# Patient Record
Sex: FEMALE | Race: WHITE | NOT HISPANIC OR LATINO | Employment: OTHER | URBAN - METROPOLITAN AREA
[De-identification: names, ages, dates, MRNs, and addresses within clinical notes are randomized per-mention and may not be internally consistent; named-entity substitution may affect disease eponyms.]

---

## 2017-02-15 ENCOUNTER — ANESTHESIA (OUTPATIENT)
Dept: SURGERY | Facility: HOSPITAL | Age: 37
End: 2017-02-15
Payer: MEDICAID

## 2017-02-15 ENCOUNTER — HOSPITAL ENCOUNTER (INPATIENT)
Facility: HOSPITAL | Age: 37
LOS: 2 days | Discharge: HOME | End: 2017-02-17
Attending: EMERGENCY MEDICINE | Admitting: NEUROLOGICAL SURGERY
Payer: MEDICAID

## 2017-02-15 ENCOUNTER — ANESTHESIA EVENT (OUTPATIENT)
Dept: SURGERY | Facility: HOSPITAL | Age: 37
End: 2017-02-15
Payer: MEDICAID

## 2017-02-15 DIAGNOSIS — G91.9 HYDROCEPHALUS: ICD-10-CM

## 2017-02-15 DIAGNOSIS — C79.31 SECONDARY ADENOCARCINOMA OF BRAIN: ICD-10-CM

## 2017-02-15 DIAGNOSIS — C79.31 BRAIN METASTASES: Primary | ICD-10-CM

## 2017-02-15 DIAGNOSIS — Z15.09 BRCA1 GENE MUTATION POSITIVE: ICD-10-CM

## 2017-02-15 DIAGNOSIS — Z15.01 BRCA1 GENE MUTATION POSITIVE: ICD-10-CM

## 2017-02-15 LAB
ANION GAP SERPL CALC-SCNC: 8 MMOL/L
APTT BLDCRRT: 24.2 SEC
BASOPHILS # BLD AUTO: 0.02 K/UL
BASOPHILS NFR BLD: 0.3 %
BUN SERPL-MCNC: 5 MG/DL
CALCIUM SERPL-MCNC: 9.3 MG/DL
CHLORIDE SERPL-SCNC: 110 MMOL/L
CO2 SERPL-SCNC: 22 MMOL/L
CREAT SERPL-MCNC: 0.8 MG/DL
DIFFERENTIAL METHOD: ABNORMAL
EOSINOPHIL # BLD AUTO: 0.1 K/UL
EOSINOPHIL NFR BLD: 0.9 %
ERYTHROCYTE [DISTWIDTH] IN BLOOD BY AUTOMATED COUNT: 14.6 %
EST. GFR  (AFRICAN AMERICAN): >60 ML/MIN/1.73 M^2
EST. GFR  (NON AFRICAN AMERICAN): >60 ML/MIN/1.73 M^2
GLUCOSE SERPL-MCNC: 97 MG/DL
HCT VFR BLD AUTO: 32.2 %
HGB BLD-MCNC: 10.4 G/DL
INR PPP: 1
LYMPHOCYTES # BLD AUTO: 1.4 K/UL
LYMPHOCYTES NFR BLD: 18.5 %
MCH RBC QN AUTO: 26.1 PG
MCHC RBC AUTO-ENTMCNC: 32.3 %
MCV RBC AUTO: 81 FL
MONOCYTES # BLD AUTO: 0.4 K/UL
MONOCYTES NFR BLD: 5.7 %
NEUTROPHILS # BLD AUTO: 5.7 K/UL
NEUTROPHILS NFR BLD: 74.5 %
PLATELET # BLD AUTO: 348 K/UL
PMV BLD AUTO: 9.9 FL
POTASSIUM SERPL-SCNC: 3.9 MMOL/L
PROTHROMBIN TIME: 10.6 SEC
RBC # BLD AUTO: 3.99 M/UL
SODIUM SERPL-SCNC: 140 MMOL/L
WBC # BLD AUTO: 7.68 K/UL

## 2017-02-15 PROCEDURE — 36000712 HC OR TIME LEV V 1ST 15 MIN: Performed by: NEUROLOGICAL SURGERY

## 2017-02-15 PROCEDURE — D9220A PRA ANESTHESIA: Mod: CRNA,,, | Performed by: NURSE ANESTHETIST, CERTIFIED REGISTERED

## 2017-02-15 PROCEDURE — 25000003 PHARM REV CODE 250: Performed by: NEUROLOGICAL SURGERY

## 2017-02-15 PROCEDURE — 99222 1ST HOSP IP/OBS MODERATE 55: CPT | Mod: ,,, | Performed by: PSYCHIATRY & NEUROLOGY

## 2017-02-15 PROCEDURE — C1729 CATH, DRAINAGE: HCPCS | Performed by: NEUROLOGICAL SURGERY

## 2017-02-15 PROCEDURE — 62223 ESTABLISH BRAIN CAVITY SHUNT: CPT | Mod: 62,,, | Performed by: NEUROLOGICAL SURGERY

## 2017-02-15 PROCEDURE — 85730 THROMBOPLASTIN TIME PARTIAL: CPT

## 2017-02-15 PROCEDURE — 80048 BASIC METABOLIC PNL TOTAL CA: CPT

## 2017-02-15 PROCEDURE — 88108 CYTOPATH CONCENTRATE TECH: CPT | Performed by: PATHOLOGY

## 2017-02-15 PROCEDURE — 63600175 PHARM REV CODE 636 W HCPCS: Performed by: NEUROLOGICAL SURGERY

## 2017-02-15 PROCEDURE — 27201423 OPTIME MED/SURG SUP & DEVICES STERILE SUPPLY: Performed by: NEUROLOGICAL SURGERY

## 2017-02-15 PROCEDURE — 8E09XBZ COMPUTER ASSISTED PROCEDURE OF HEAD AND NECK REGION: ICD-10-PCS | Performed by: NEUROLOGICAL SURGERY

## 2017-02-15 PROCEDURE — 25000003 PHARM REV CODE 250: Performed by: SURGERY

## 2017-02-15 PROCEDURE — 25000003 PHARM REV CODE 250: Performed by: STUDENT IN AN ORGANIZED HEALTH CARE EDUCATION/TRAINING PROGRAM

## 2017-02-15 PROCEDURE — 20000000 HC ICU ROOM

## 2017-02-15 PROCEDURE — 62223 ESTABLISH BRAIN CAVITY SHUNT: CPT | Mod: 62,,, | Performed by: SURGERY

## 2017-02-15 PROCEDURE — 99232 SBSQ HOSP IP/OBS MODERATE 35: CPT | Mod: 57,,, | Performed by: NEUROLOGICAL SURGERY

## 2017-02-15 PROCEDURE — 63600175 PHARM REV CODE 636 W HCPCS: Performed by: PHYSICIAN ASSISTANT

## 2017-02-15 PROCEDURE — 61781 SCAN PROC CRANIAL INTRA: CPT | Mod: ,,, | Performed by: NEUROLOGICAL SURGERY

## 2017-02-15 PROCEDURE — 36000713 HC OR TIME LEV V EA ADD 15 MIN: Performed by: NEUROLOGICAL SURGERY

## 2017-02-15 PROCEDURE — D9220A PRA ANESTHESIA: Mod: ANES,,, | Performed by: ANESTHESIOLOGY

## 2017-02-15 PROCEDURE — 37000008 HC ANESTHESIA 1ST 15 MINUTES: Performed by: NEUROLOGICAL SURGERY

## 2017-02-15 PROCEDURE — 63600175 PHARM REV CODE 636 W HCPCS: Performed by: EMERGENCY MEDICINE

## 2017-02-15 PROCEDURE — 99285 EMERGENCY DEPT VISIT HI MDM: CPT | Mod: ,,, | Performed by: EMERGENCY MEDICINE

## 2017-02-15 PROCEDURE — 25500020 PHARM REV CODE 255: Performed by: EMERGENCY MEDICINE

## 2017-02-15 PROCEDURE — 99285 EMERGENCY DEPT VISIT HI MDM: CPT | Mod: 25

## 2017-02-15 PROCEDURE — 37000009 HC ANESTHESIA EA ADD 15 MINS: Performed by: NEUROLOGICAL SURGERY

## 2017-02-15 PROCEDURE — A9585 GADOBUTROL INJECTION: HCPCS | Performed by: EMERGENCY MEDICINE

## 2017-02-15 PROCEDURE — 85025 COMPLETE CBC W/AUTO DIFF WBC: CPT

## 2017-02-15 PROCEDURE — 36000709 HC OR TIME LEV III EA ADD 15 MIN: Performed by: NEUROLOGICAL SURGERY

## 2017-02-15 PROCEDURE — 85610 PROTHROMBIN TIME: CPT

## 2017-02-15 PROCEDURE — 63600175 PHARM REV CODE 636 W HCPCS: Performed by: STUDENT IN AN ORGANIZED HEALTH CARE EDUCATION/TRAINING PROGRAM

## 2017-02-15 PROCEDURE — 25000003 PHARM REV CODE 250: Performed by: PHYSICIAN ASSISTANT

## 2017-02-15 PROCEDURE — 88108 CYTOPATH CONCENTRATE TECH: CPT | Mod: 26,,, | Performed by: PATHOLOGY

## 2017-02-15 PROCEDURE — 36000708 HC OR TIME LEV III 1ST 15 MIN: Performed by: NEUROLOGICAL SURGERY

## 2017-02-15 PROCEDURE — 00160J6 BYPASS CEREBRAL VENTRICLE TO PERITONEAL CAVITY WITH SYNTHETIC SUBSTITUTE, OPEN APPROACH: ICD-10-PCS | Performed by: NEUROLOGICAL SURGERY

## 2017-02-15 PROCEDURE — 96374 THER/PROPH/DIAG INJ IV PUSH: CPT

## 2017-02-15 DEVICE — KIT CATH WITH BACTISEAL: Type: IMPLANTABLE DEVICE | Site: ABDOMEN | Status: FUNCTIONAL

## 2017-02-15 DEVICE — VALVE CERTAS PROGRAMMABLE: Type: IMPLANTABLE DEVICE | Site: CRANIAL | Status: FUNCTIONAL

## 2017-02-15 DEVICE — RESERVOIR BURR HOLE UNITIZED: Type: IMPLANTABLE DEVICE | Site: CRANIAL | Status: FUNCTIONAL

## 2017-02-15 RX ORDER — SODIUM CHLORIDE 0.9 % (FLUSH) 0.9 %
3 SYRINGE (ML) INJECTION
Status: DISCONTINUED | OUTPATIENT
Start: 2017-02-15 | End: 2017-02-17 | Stop reason: HOSPADM

## 2017-02-15 RX ORDER — GLYCOPYRROLATE 0.2 MG/ML
INJECTION INTRAMUSCULAR; INTRAVENOUS
Status: DISCONTINUED | OUTPATIENT
Start: 2017-02-15 | End: 2017-02-15

## 2017-02-15 RX ORDER — SODIUM CHLORIDE 9 MG/ML
INJECTION, SOLUTION INTRAVENOUS CONTINUOUS PRN
Status: DISCONTINUED | OUTPATIENT
Start: 2017-02-15 | End: 2017-02-15

## 2017-02-15 RX ORDER — NEOSTIGMINE METHYLSULFATE 1 MG/ML
INJECTION, SOLUTION INTRAVENOUS
Status: DISCONTINUED | OUTPATIENT
Start: 2017-02-15 | End: 2017-02-15

## 2017-02-15 RX ORDER — PANTOPRAZOLE SODIUM 40 MG/10ML
40 INJECTION, POWDER, LYOPHILIZED, FOR SOLUTION INTRAVENOUS
Status: DISCONTINUED | OUTPATIENT
Start: 2017-02-16 | End: 2017-02-16

## 2017-02-15 RX ORDER — HYDROMORPHONE HYDROCHLORIDE 1 MG/ML
0.2 INJECTION, SOLUTION INTRAMUSCULAR; INTRAVENOUS; SUBCUTANEOUS EVERY 5 MIN PRN
Status: CANCELLED | OUTPATIENT
Start: 2017-02-15

## 2017-02-15 RX ORDER — BACLOFEN 20 MG/1
TABLET ORAL
COMMUNITY

## 2017-02-15 RX ORDER — SERTRALINE HYDROCHLORIDE 50 MG/1
50 TABLET, FILM COATED ORAL
COMMUNITY

## 2017-02-15 RX ORDER — ONDANSETRON 2 MG/ML
INJECTION INTRAMUSCULAR; INTRAVENOUS
Status: DISCONTINUED | OUTPATIENT
Start: 2017-02-15 | End: 2017-02-15

## 2017-02-15 RX ORDER — LIDOCAINE HCL/PF 100 MG/5ML
SYRINGE (ML) INTRAVENOUS
Status: DISCONTINUED | OUTPATIENT
Start: 2017-02-15 | End: 2017-02-15

## 2017-02-15 RX ORDER — ACETAMINOPHEN 650 MG/1
650 SUPPOSITORY RECTAL EVERY 6 HOURS PRN
Status: DISCONTINUED | OUTPATIENT
Start: 2017-02-15 | End: 2017-02-17 | Stop reason: HOSPADM

## 2017-02-15 RX ORDER — SODIUM CHLORIDE AND POTASSIUM CHLORIDE 150; 900 MG/100ML; MG/100ML
INJECTION, SOLUTION INTRAVENOUS CONTINUOUS
Status: DISCONTINUED | OUTPATIENT
Start: 2017-02-15 | End: 2017-02-15

## 2017-02-15 RX ORDER — BACLOFEN 10 MG/1
20 TABLET ORAL 3 TIMES DAILY
Status: DISCONTINUED | OUTPATIENT
Start: 2017-02-15 | End: 2017-02-17 | Stop reason: HOSPADM

## 2017-02-15 RX ORDER — MIDAZOLAM HYDROCHLORIDE 1 MG/ML
INJECTION, SOLUTION INTRAMUSCULAR; INTRAVENOUS
Status: DISCONTINUED | OUTPATIENT
Start: 2017-02-15 | End: 2017-02-15

## 2017-02-15 RX ORDER — DEXAMETHASONE SODIUM PHOSPHATE 4 MG/ML
3 INJECTION, SOLUTION INTRA-ARTICULAR; INTRALESIONAL; INTRAMUSCULAR; INTRAVENOUS; SOFT TISSUE EVERY 8 HOURS
Status: DISCONTINUED | OUTPATIENT
Start: 2017-02-15 | End: 2017-02-15

## 2017-02-15 RX ORDER — SCOLOPAMINE TRANSDERMAL SYSTEM 1 MG/1
1 PATCH, EXTENDED RELEASE TRANSDERMAL
Status: DISCONTINUED | OUTPATIENT
Start: 2017-02-15 | End: 2017-02-17 | Stop reason: HOSPADM

## 2017-02-15 RX ORDER — ROCURONIUM BROMIDE 10 MG/ML
INJECTION, SOLUTION INTRAVENOUS
Status: DISCONTINUED | OUTPATIENT
Start: 2017-02-15 | End: 2017-02-15

## 2017-02-15 RX ORDER — ALPRAZOLAM 1 MG/1
TABLET ORAL
COMMUNITY
Start: 2017-01-16

## 2017-02-15 RX ORDER — LIDOCAINE HYDROCHLORIDE AND EPINEPHRINE 10; 10 MG/ML; UG/ML
INJECTION, SOLUTION INFILTRATION; PERINEURAL
Status: DISCONTINUED | OUTPATIENT
Start: 2017-02-15 | End: 2017-02-15 | Stop reason: HOSPADM

## 2017-02-15 RX ORDER — PROMETHAZINE HYDROCHLORIDE 25 MG/1
25 TABLET ORAL EVERY 6 HOURS PRN
Status: DISCONTINUED | OUTPATIENT
Start: 2017-02-15 | End: 2017-02-17 | Stop reason: HOSPADM

## 2017-02-15 RX ORDER — SERTRALINE HYDROCHLORIDE 50 MG/1
50 TABLET, FILM COATED ORAL DAILY
Status: DISCONTINUED | OUTPATIENT
Start: 2017-02-15 | End: 2017-02-17 | Stop reason: HOSPADM

## 2017-02-15 RX ORDER — ONDANSETRON 2 MG/ML
4 INJECTION INTRAMUSCULAR; INTRAVENOUS DAILY PRN
Status: CANCELLED | OUTPATIENT
Start: 2017-02-15

## 2017-02-15 RX ORDER — DIPHENHYDRAMINE HYDROCHLORIDE 50 MG/ML
25 INJECTION INTRAMUSCULAR; INTRAVENOUS
Status: COMPLETED | OUTPATIENT
Start: 2017-02-15 | End: 2017-02-15

## 2017-02-15 RX ORDER — PROPOFOL 10 MG/ML
VIAL (ML) INTRAVENOUS
Status: DISCONTINUED | OUTPATIENT
Start: 2017-02-15 | End: 2017-02-15

## 2017-02-15 RX ORDER — BUPIVACAINE HYDROCHLORIDE 2.5 MG/ML
INJECTION, SOLUTION EPIDURAL; INFILTRATION; INTRACAUDAL
Status: DISCONTINUED | OUTPATIENT
Start: 2017-02-15 | End: 2017-02-15 | Stop reason: HOSPADM

## 2017-02-15 RX ORDER — PROMETHAZINE HYDROCHLORIDE 25 MG/1
TABLET ORAL
COMMUNITY

## 2017-02-15 RX ORDER — GADOBUTROL 604.72 MG/ML
9 INJECTION INTRAVENOUS
Status: COMPLETED | OUTPATIENT
Start: 2017-02-15 | End: 2017-02-15

## 2017-02-15 RX ORDER — FENTANYL CITRATE 50 UG/ML
INJECTION, SOLUTION INTRAMUSCULAR; INTRAVENOUS
Status: DISCONTINUED | OUTPATIENT
Start: 2017-02-15 | End: 2017-02-15

## 2017-02-15 RX ORDER — ACETAMINOPHEN 500 MG/1
CAPSULE, LIQUID FILLED ORAL
COMMUNITY
End: 2017-12-14

## 2017-02-15 RX ORDER — HYDROCODONE BITARTRATE AND ACETAMINOPHEN 5; 325 MG/1; MG/1
TABLET ORAL
Status: ON HOLD | COMMUNITY
Start: 2016-12-15 | End: 2017-02-17 | Stop reason: HOSPADM

## 2017-02-15 RX ORDER — HYDROCODONE BITARTRATE AND ACETAMINOPHEN 5; 325 MG/1; MG/1
1 TABLET ORAL EVERY 4 HOURS PRN
Status: DISCONTINUED | OUTPATIENT
Start: 2017-02-15 | End: 2017-02-17 | Stop reason: HOSPADM

## 2017-02-15 RX ORDER — ONDANSETRON HYDROCHLORIDE 8 MG/1
TABLET, FILM COATED ORAL
COMMUNITY
Start: 2016-12-30

## 2017-02-15 RX ORDER — DEXAMETHASONE SODIUM PHOSPHATE 4 MG/ML
INJECTION, SOLUTION INTRA-ARTICULAR; INTRALESIONAL; INTRAMUSCULAR; INTRAVENOUS; SOFT TISSUE
Status: DISCONTINUED | OUTPATIENT
Start: 2017-02-15 | End: 2017-02-15

## 2017-02-15 RX ORDER — PHENYLEPHRINE HYDROCHLORIDE 10 MG/ML
INJECTION INTRAVENOUS
Status: DISCONTINUED | OUTPATIENT
Start: 2017-02-15 | End: 2017-02-15

## 2017-02-15 RX ORDER — BACITRACIN ZINC 500 UNIT/G
OINTMENT (GRAM) TOPICAL
Status: DISCONTINUED | OUTPATIENT
Start: 2017-02-15 | End: 2017-02-15 | Stop reason: HOSPADM

## 2017-02-15 RX ORDER — CEFAZOLIN SODIUM 1 G/50ML
1 SOLUTION INTRAVENOUS
Status: COMPLETED | OUTPATIENT
Start: 2017-02-15 | End: 2017-02-16

## 2017-02-15 RX ORDER — HYDROCODONE BITARTRATE AND ACETAMINOPHEN 5; 325 MG/1; MG/1
1 TABLET ORAL EVERY 4 HOURS PRN
Status: DISCONTINUED | OUTPATIENT
Start: 2017-02-15 | End: 2017-02-15

## 2017-02-15 RX ORDER — SODIUM CHLORIDE 9 MG/ML
INJECTION, SOLUTION INTRAVENOUS CONTINUOUS
Status: DISCONTINUED | OUTPATIENT
Start: 2017-02-15 | End: 2017-02-16

## 2017-02-15 RX ORDER — ALPRAZOLAM 1 MG/1
1 TABLET ORAL 2 TIMES DAILY PRN
Status: DISCONTINUED | OUTPATIENT
Start: 2017-02-15 | End: 2017-02-17 | Stop reason: HOSPADM

## 2017-02-15 RX ORDER — BACITRACIN 50000 [IU]/1
INJECTION, POWDER, FOR SOLUTION INTRAMUSCULAR
Status: DISCONTINUED | OUTPATIENT
Start: 2017-02-15 | End: 2017-02-15 | Stop reason: HOSPADM

## 2017-02-15 RX ORDER — ONDANSETRON 4 MG/1
8 TABLET, FILM COATED ORAL EVERY 6 HOURS PRN
Status: DISCONTINUED | OUTPATIENT
Start: 2017-02-15 | End: 2017-02-17 | Stop reason: HOSPADM

## 2017-02-15 RX ORDER — ACETAMINOPHEN 10 MG/ML
INJECTION, SOLUTION INTRAVENOUS
Status: DISCONTINUED | OUTPATIENT
Start: 2017-02-15 | End: 2017-02-15

## 2017-02-15 RX ORDER — DEXAMETHASONE SODIUM PHOSPHATE 4 MG/ML
4 INJECTION, SOLUTION INTRA-ARTICULAR; INTRALESIONAL; INTRAMUSCULAR; INTRAVENOUS; SOFT TISSUE EVERY 6 HOURS
Status: DISCONTINUED | OUTPATIENT
Start: 2017-02-15 | End: 2017-02-17 | Stop reason: HOSPADM

## 2017-02-15 RX ORDER — ACETAMINOPHEN 325 MG/1
650 TABLET ORAL EVERY 6 HOURS PRN
Status: DISCONTINUED | OUTPATIENT
Start: 2017-02-15 | End: 2017-02-17 | Stop reason: HOSPADM

## 2017-02-15 RX ADMIN — FENTANYL CITRATE 100 MCG: 50 INJECTION, SOLUTION INTRAMUSCULAR; INTRAVENOUS at 03:02

## 2017-02-15 RX ADMIN — BACLOFEN 20 MG: 10 TABLET ORAL at 10:02

## 2017-02-15 RX ADMIN — PHENYLEPHRINE HYDROCHLORIDE 100 MCG: 10 INJECTION INTRAVENOUS at 02:02

## 2017-02-15 RX ADMIN — MIDAZOLAM HYDROCHLORIDE 2 MG: 1 INJECTION, SOLUTION INTRAMUSCULAR; INTRAVENOUS at 12:02

## 2017-02-15 RX ADMIN — CEFTRIAXONE 1 G: 1 INJECTION, SOLUTION INTRAVENOUS at 01:02

## 2017-02-15 RX ADMIN — Medication 4 MG: at 01:02

## 2017-02-15 RX ADMIN — SODIUM CHLORIDE: 0.9 INJECTION, SOLUTION INTRAVENOUS at 12:02

## 2017-02-15 RX ADMIN — FENTANYL CITRATE 100 MCG: 50 INJECTION, SOLUTION INTRAMUSCULAR; INTRAVENOUS at 01:02

## 2017-02-15 RX ADMIN — GADOBUTROL 9 ML: 604.72 INJECTION INTRAVENOUS at 06:02

## 2017-02-15 RX ADMIN — GLYCOPYRROLATE 0.6 MG: 0.2 INJECTION, SOLUTION INTRAMUSCULAR; INTRAVENOUS at 03:02

## 2017-02-15 RX ADMIN — DIPHENHYDRAMINE HYDROCHLORIDE 25 MG: 50 INJECTION, SOLUTION INTRAMUSCULAR; INTRAVENOUS at 04:02

## 2017-02-15 RX ADMIN — CEFAZOLIN SODIUM 1 G: 1 SOLUTION INTRAVENOUS at 05:02

## 2017-02-15 RX ADMIN — SODIUM CHLORIDE: 0.9 INJECTION, SOLUTION INTRAVENOUS at 05:02

## 2017-02-15 RX ADMIN — SODIUM CHLORIDE, SODIUM GLUCONATE, SODIUM ACETATE, POTASSIUM CHLORIDE, MAGNESIUM CHLORIDE, SODIUM PHOSPHATE, DIBASIC, AND POTASSIUM PHOSPHATE: .53; .5; .37; .037; .03; .012; .00082 INJECTION, SOLUTION INTRAVENOUS at 02:02

## 2017-02-15 RX ADMIN — VANCOMYCIN HYDROCHLORIDE 10 MG: 500 INJECTION, POWDER, LYOPHILIZED, FOR SOLUTION INTRAVENOUS at 01:02

## 2017-02-15 RX ADMIN — NEOSTIGMINE METHYLSULFATE 5 MG: 1 INJECTION INTRAVENOUS at 03:02

## 2017-02-15 RX ADMIN — ONDANSETRON 4 MG: 2 INJECTION INTRAMUSCULAR; INTRAVENOUS at 03:02

## 2017-02-15 RX ADMIN — BACLOFEN 20 MG: 10 TABLET ORAL at 02:02

## 2017-02-15 RX ADMIN — DEXAMETHASONE SODIUM PHOSPHATE 8 MG: 4 INJECTION, SOLUTION INTRAMUSCULAR; INTRAVENOUS at 01:02

## 2017-02-15 RX ADMIN — ALPRAZOLAM 1 MG: 1 TABLET ORAL at 10:02

## 2017-02-15 RX ADMIN — LIDOCAINE HYDROCHLORIDE 80 MG: 20 INJECTION, SOLUTION INTRAVENOUS at 01:02

## 2017-02-15 RX ADMIN — SCOPALAMINE 1.5 MG: 1 PATCH, EXTENDED RELEASE TRANSDERMAL at 01:02

## 2017-02-15 RX ADMIN — DEXAMETHASONE SODIUM PHOSPHATE 4 MG: 4 INJECTION, SOLUTION INTRAMUSCULAR; INTRAVENOUS at 08:02

## 2017-02-15 RX ADMIN — ROCURONIUM BROMIDE 40 MG: 10 INJECTION, SOLUTION INTRAVENOUS at 01:02

## 2017-02-15 RX ADMIN — SERTRALINE HYDROCHLORIDE 50 MG: 50 TABLET ORAL at 10:02

## 2017-02-15 RX ADMIN — FENTANYL CITRATE 50 MCG: 50 INJECTION, SOLUTION INTRAMUSCULAR; INTRAVENOUS at 04:02

## 2017-02-15 RX ADMIN — PROPOFOL 40 MG: 10 INJECTION, EMULSION INTRAVENOUS at 01:02

## 2017-02-15 RX ADMIN — PROPOFOL 120 MG: 10 INJECTION, EMULSION INTRAVENOUS at 01:02

## 2017-02-15 RX ADMIN — ROCURONIUM BROMIDE 10 MG: 10 INJECTION, SOLUTION INTRAVENOUS at 01:02

## 2017-02-15 RX ADMIN — ACETAMINOPHEN 1000 MG: 10 INJECTION, SOLUTION INTRAVENOUS at 01:02

## 2017-02-15 RX ADMIN — FENTANYL CITRATE 50 MCG: 50 INJECTION, SOLUTION INTRAMUSCULAR; INTRAVENOUS at 01:02

## 2017-02-15 NOTE — CONSULTS
Consult Note  Neurosurgery    Consult Requested By: ED  Reason for Consult: headaches and imbalance    SUBJECTIVE:     History of Present Illness:  Ms Yang is a 36 y.o. female with a history of stage IV breast cancer with bone metastasis to the hip diagnosed 2016 s/p double mastectomy soon after with chemo last done 4 months ago, no radiation. She developed a headache 3 days ago which has progressively gotten worse. She has a 1 day history of gait imbalance with falls, no head trauma. She denies any nausea, vomiting, visual changes, numbness, focal weakness ,or fevers. She took aspirin this morning for her headache. She was transferred from [facility] for neurosurgical evaluation after CT of the head showed cerebellar hypodensities and mild to moderate hydrocephalus.     Scheduled Meds:  Continuous Infusions:  PRN Meds:    Review of patient's allergies indicates:   Allergen Reactions    Azithromycin      SHAKES  SHAKES       Past Medical History   Diagnosis Date    Anxiety     Breast CA     Chemotherapy induced neutropenia     Depression     Esophageal stricture     Gallstone     GERD (gastroesophageal reflux disease)     IBS (irritable bowel syndrome)     Left breast mass     Metastasis from breast cancer      Bone    Obesity, unspecified     Unspecified transient mental disorder due to conditions classified elsewhere      Past Surgical History   Procedure Laterality Date     section      Nasal endoscopy      Intertrochanteric hip fracture surgery      Portacath placement      Mastectomy Bilateral      History reviewed. No pertinent family history.  Social History   Substance Use Topics    Smoking status: Never Smoker    Smokeless tobacco: None    Alcohol use No        Review of Systems:  12 point ROS negative except as above    OBJECTIVE:     Vital Signs (Most Recent)  Pulse: (!) 114 (02/15/17 0529)  Resp: 16 (02/15/17 0529)  BP: (!) 142/67 (02/15/17 0531)  SpO2: 99 % (02/15/17  0531)    Vital Signs Range (Last 24H):  Pulse:  []   Resp:  [16-19]   BP: (139-154)/(67-84)   SpO2:  [98 %-100 %]     Physical Exam:  -Alert and oriented x4  -PERRL, EOMI, face symmetrical, tongue midline, facial sensation symmetric, shoulder shrug full strength and symmetric  -Motor: 5/5 throughout the upper and lower extremites bilaterally  -sensation intact to light touch throughout  -reflexes 2+ in patella and brachioradialis bilaterally  -no clonus, no Gay's  -coordination intact to finger to nose bilaterally  -no drift  -no nystagmus      Laboratory:  CBC: No results for input(s): WBC, RBC, HGB, HCT, PLT, MCV, MCH, MCHC in the last 168 hours.  BMP: No results for input(s): GLU, NA, K, CL, CO2, BUN, CREATININE, CALCIUM, MG in the last 168 hours.  CMP: No results for input(s): GLU, CALCIUM, ALBUMIN, PROT, NA, K, CO2, CL, BUN, CREATININE, ALKPHOS, ALT, AST, BILITOT in the last 168 hours.  LFTs: No results for input(s): ALT, AST, ALKPHOS, BILITOT, PROT, ALBUMIN in the last 168 hours.  Coagulation: No results for input(s): INR, APTT in the last 168 hours.    Invalid input(s): PT  Cardiac markers: No results for input(s): CKMB, CPKMB, TROPONINT, TROPONINI, MYOGLOBIN in the last 168 hours.  ABGs: No results for input(s): PH, PCO2, PO2, HCO3, POCSATURATED, BE in the last 168 hours.  Microbiology Results (last 7 days)     ** No results found for the last 168 hours. **        Specimen     None        No results for input(s): COLORU, CLARITYU, SPECGRAV, PHUR, PROTEINUA, GLUCOSEU, BILIRUBINCON, BLOODU, WBCU, RBCU, BACTERIA, MUCUS, NITRITE, LEUKOCYTESUR, UROBILINOGEN, HYALINECASTS in the last 168 hours.    Diagnostic Results:  CT of the head from OSH shows cerebellar hypodensities with mild to moderate hydrocephalus with early rounding of third ventricle and slightly dilated lateral ventricles.     MRI showing innumerable contrast enhancing lesions in cerebellum, with largest near foramen magnum likely cause of  obstructive hydrocephalus.     ASSESSMENT/PLAN:     36 year old female with history of breast cancer with bony metastasis s/p double mastectomy and s/p chemotherapy that presents as a transfer for neurosurgical evaluation after CT of the head shows cerebellar hypodensities concerning for metastasis and developing hydrocephalus. MRI showing innumerable contrast enhancing lesions in cerebellum, with largest near foramen magnum likely cause of obstructive hydrocephalus.     -admit to floor on NS stepdown unit  -will discuss options of radiation and possible  shunt with patient and family  -NPO at this time  -will restart home medications  -platelets if patient to go to surgery acutely due to recent use of Excedrin containing aspirin  -discussed with Dr Renny Oliver MD  PGY-1 [facility]  Pager: [phone]

## 2017-02-15 NOTE — BRIEF OP NOTE
[facility]  Neurosurgery  Operative Note    SUMMARY      Date of Procedure: 2/15/2017     Procedure: Procedure(s) (LRB):  SHUNT- with neuronavigation (Right)     Surgeon(s) and Role:     * Rajan Lawson MD - Primary     * Carroll Comer MD - Assisting    Isiah Quick MD    Pre-Operative Diagnosis: Hydrocephalus [G91.9]    Post-Operative Diagnosis: Post-Op Diagnosis Codes:     * Hydrocephalus [G91.9]    Anesthesia: General    Technical Procedures Used:  shunt; Codman Certas set at 4.     Description of the Findings of the Procedure: see full op note    Complications: No    Estimated Blood Loss (EBL): * No values recorded between 2/15/2017  2:24 PM and 2/15/2017  4:14 PM *           Specimens:   Specimen     None           Implants:   Implant Name Type Inv. Item Serial No.  Lot No. LRB No. Used   VALVE CERTAS PROGRAMMABLE - [id number]  VALVE CERTAS PROGRAMMABLE  [employer] [id number] N/A 1   KIT CATHETER WITH BACTISEAL - [id number]  KIT CATHETER WITH BACTISEAL  [employer] [id number] N/A 1   RESERVOIR EDGAR HOLE UNITIZED - [id number]   RESERVOIR EDGAR HOLE UNITIZED   [facility] [id number] N/A 1              Condition: Good    Disposition: ICU - extubated and stable.

## 2017-02-15 NOTE — BRIEF OP NOTE
Operative Note       Surgery Date: 2/15/2017     Surgeon(s) and Role:     * Rajan Lawson MD - Primary     * Carroll Comer MD - Assisting    Pre-op Diagnosis:  Hydrocephalus [G91.9]    Post-op Diagnosis:  Hydrocephalus [G91.9]    Procedure(s) (LRB):  SHUNT- with neuronavigation (Right)    Anesthesia: General    Procedure in Detail/Findings:   to abdomen without complication    Estimated Blood Loss: Minimal           Specimens     Start     Ordered    02/15/17 1515  Cytology Specimen-Medical Cytology (Fluid/Wash/Brush)  Once     Question Answer Comment   Clinical Information: CSF for Cytology    Specific Site: head    Other Requests: none        02/15/17 1530        Implants:   Implant Name Type Inv. Item Serial No.  Lot No. LRB No. Used   VALVE CERTAS PROGRAMMABLE - [id number]  VALVE CERTAS PROGRAMMABLE  [employer] [id number] N/A 1   KIT CATHETER WITH BACTISEAL - [id number]  KIT CATHETER WITH BACTISEAL  [facility] [id number] N/A 1   RESERVOIR EDGAR HOLE UNITIZED - [id number]   RESERVOIR EDGAR HOLE UNITIZED   [employer] [facility] [id number] N/A 1              Disposition: PACU - hemodynamically stable.           Condition: Good    Attestation:  I was present and scrubbed for the entire procedure.

## 2017-02-15 NOTE — ED TRIAGE NOTES
Edgar Yang, a 36 y.o. female presents to the ED as a transfer from [place]. Pt has been experiencing headaches and dizziness for several days. Loss of balance yesterday with a fall, states that she just lost control of her legs. Pt denies any LOC.  Pt complains of an intermittent, right sided, sharp 4/10 headache.     Chief Complaint   Patient presents with    Transfer- Hydrocephalus     Syncopal episode, hydrocephalus, hx of breast CA.     Review of patient's allergies indicates:  Allergies not on file  No past medical history on file.    Adult Physical Assessment  LOC: Edgar Yang, 36 y.o. female verified via two identifiers.  The patient is awake, alert, oriented and speaking appropriately at this time.  APPEARANCE: Patient resting comfortably and appears to be in no acute distress at this time. Patient is clean and well groomed, patient's clothing is properly fastened.  SKIN:The skin is warm and dry, color consistent with ethnicity, patient has normal skin turgor and moist mucus membranes, skin intact, no breakdown or brusing noted.  MUSCULOSKELETAL: Patient moving all extremities well, no obvious swelling or deformities noted.  RESPIRATORY: Airway is open and patent, respirations are spontaneous, patient has a normal effort and rate, no accessory muscle use noted.  CARDIAC: Patient has a normal rate and rhythm, no periphreal edema noted in any extremity, capillary refill < 3 seconds in all extremities  ABDOMEN: Soft and non tender to palpation, no abdominal distention noted. Bowel sounds present in all four quadrants.  NEUROLOGIC: Eyes open spontaneously, behavior appropriate to situation, follows commands, facial expression symmetrical, bilateral hand grasp equal and even, purposeful motor response noted, normal sensation in all extremities when touched with a finger.

## 2017-02-15 NOTE — ANESTHESIA PREPROCEDURE EVALUATION
02/15/2017  Edgar Yang is a 36 y.o., female c hx of breast cancer and bilateral mastectomy followed by radiation and chemo.  Had been doing fine until she experienced headaches and dizziness 2 days ago.  Work up revealed obstructive hydrocephalus and multiple mets to the cerebellum.    [facility] Anesthesia Evaluation    I have reviewed the Patient Summary Reports.    I have reviewed the Nursing Notes.   I have reviewed the Medications.     Review of Systems  Anesthesia Hx:  History of prior surgery of interest to airway management or planning: Personal Hx of Anesthesia complications, Post-Operative Nausea/Vomiting, with every anesthetic, treatment not known   Social:  No Alcohol Use, Non-Smoker    Hematology/Oncology:  Hematology Normal       -- Cancer in past history:  Breast bilateral no lymphedema chemotherapy, radiation and surgery    Cardiovascular:   Exercise tolerance: good Denies Hypertension.  Denies MI.  Denies CAD.       Pulmonary:  Pulmonary Normal    Renal/:  Renal/ Normal     Hepatic/GI:   GERD, well controlled    Neurological:   Denies TIA. Denies CVA. Neuromuscular Disease,     Endocrine:  Endocrine Normal        Physical Exam  General:  Obesity    Airway/Jaw/Neck:  Airway Findings: Mouth Opening: Normal Tongue: Normal  General Airway Assessment: Adult, Good  Mallampati: I  TM Distance: 4 - 6 cm  Jaw/Neck Findings:  Neck ROM: Normal ROM     Eyes/Ears/Nose:  Eyes/Ears/Nose Findings:    Dental:  Dental Findings: In tact   Chest/Lungs:  Chest/Lungs Findings: Normal Respiratory Rate     Heart/Vascular:  Heart Findings: Rate: Normal  Rhythm: Regular Rhythm        Mental Status:  Mental Status Findings:  Cooperative, Alert and Oriented         Anesthesia Plan  Type of Anesthesia, risks & benefits discussed:  Anesthesia Type:  general  Patient's Preference:   Intra-op Monitoring Plan: standard ASA  monitors  Intra-op Monitoring Plan Comments:   Post Op Pain Control Plan:   Post Op Pain Control Plan Comments:   Induction:   IV  Beta Blocker:  Patient is not currently on a Beta-Blocker (No further documentation required).       Informed Consent: Patient understands risks and agrees with Anesthesia plan.  Questions answered. Anesthesia consent signed with patient.  ASA Score: 3     Day of Surgery Review of History & Physical: I have interviewed and examined the patient. I have reviewed the patient's H&P dated:  There are no significant changes.  H&P update referred to the surgeon.         Ready For Surgery From Anesthesia Perspective.

## 2017-02-15 NOTE — IP AVS SNAPSHOT
[facility]  [address]  [facility] [address]  Phone: [phone]           Patient Discharge Instructions     Our goal is to set you up for success. This packet includes information on your condition, medications, and your home care. It will help you to care for yourself so you don't get sicker and need to go back to the hospital.     Please ask your nurse if you have any questions.        There are many details to remember when preparing to leave the hospital. Here is what you will need to do:    1. Take your medicine. If you are prescribed medications, review your Medication List in the following pages. You may have new medications to  at the pharmacy and others that you'll need to stop taking. Review the instructions for how and when to take your medications. Talk with your doctor or nurses if you are unsure of what to do.     2. Go to your follow-up appointments. Specific follow-up information is listed in the following pages. Your may be contacted by a transition nurse or clinical provider about future appointments. Be sure we have all of the phone numbers to reach you, if needed. Please contact your provider's office if you are unable to make an appointment.     3. Watch for warning signs. Your doctor or nurse will give you detailed warning signs to watch for and when to call for assistance. These instructions may also include educational information about your condition. If you experience any of warning signs to your health, call your doctor.               [facility]  Unless otherwise directed by your provider, please contact [facility], our nurse care line that is available for 24/7 assistance.     [phone] (toll-free)    Registered nurses in the [facility] provide clinical advisement, health education, appointment booking, and other advisory services.                    ** Verify the list of medication(s) below is accurate and up  to date. Carry this with you in case of emergency. If your medications have changed, please notify your healthcare provider.             Medication List      START taking these medications        Additional Info                      dexamethasone 2 MG tablet   Commonly known as:  DECADRON   Quantity:  90 tablet   Refills:  0   Dose:  2 mg   Comments:    - 4 mg Q 8 hours x 3 days, then    - 4 mg Q 12 hours x 3 days, then    - 4 mg Q AM, 2 mg Q PM x 3 days, then     - 2 mg BID thereafter    Instructions:  Take 1 tablet (2 mg total) by mouth As instructed.     Begin Date    AM    Noon    PM    Bedtime       lisinopril 20 MG tablet   Commonly known as:  PRINIVIL,ZESTRIL   Quantity:  90 tablet   Refills:  1   Dose:  20 mg    Last time this was given:  20 mg on 2/17/2017  9:36 AM   Instructions:  Take 1 tablet (20 mg total) by mouth once daily.     Begin Date    AM    Noon    PM    Bedtime       pantoprazole 40 MG tablet   Commonly known as:  PROTONIX   Quantity:  30 tablet   Refills:  0   Dose:  40 mg    Instructions:  Take 1 tablet (40 mg total) by mouth once daily.     Begin Date    AM    Noon    PM    Bedtime         CHANGE how you take these medications        Additional Info                      * hydrocodone-acetaminophen 5-325mg 5-325 mg per tablet   Commonly known as:  NORCO   Refills:  0   What changed:  Another medication with the same name was added. Make sure you understand how and when to take each.    Last time this was given:  1 tablet on 2/16/2017  1:11 AM   Instructions:  Take 1-2 tablets by mouth     Begin Date    AM    Noon    PM    Bedtime       * hydrocodone-acetaminophen 5-325mg 5-325 mg per tablet   Commonly known as:  NORCO   Quantity:  60 tablet   Refills:  0   Dose:  1 tablet   What changed:  You were already taking a medication with the same name, and this prescription was added. Make sure you understand how and when to take each.    Last time this was given:  1 tablet on 2/16/2017  1:11 AM    Instructions:  Take 1 tablet by mouth every 4 (four) hours as needed.     Begin Date    AM    Noon    PM    Bedtime       * Notice:  This list has 2 medication(s) that are the same as other medications prescribed for you. Read the directions carefully, and ask your doctor or other care provider to review them with you.      CONTINUE taking these medications        Additional Info                      acetaminophen 500 mg Cap   Commonly known as:  TYLENOL   Refills:  0    Instructions:  Take by mouth 2 (two) times daily     Begin Date    AM    Noon    PM    Bedtime       alprazolam 1 MG tablet   Commonly known as:  XANAX   Refills:  0    Last time this was given:  1 mg on 2/16/2017  9:52 PM   Instructions:  Take 1 mg by mouth     Begin Date    AM    Noon    PM    Bedtime       baclofen 20 MG tablet   Commonly known as:  LIORESAL   Refills:  0    Last time this was given:  20 mg on 2/17/2017  2:23 PM   Instructions:  Take 20 mg by mouth     Begin Date    AM    Noon    PM    Bedtime       ondansetron 8 MG tablet   Commonly known as:  ZOFRAN   Refills:  0    Last time this was given:  8 mg on 2/16/2017  1:13 AM   Instructions:  Take 4 mg by mouth     Begin Date    AM    Noon    PM    Bedtime       promethazine 25 MG tablet   Commonly known as:  PHENERGAN   Refills:  0    Instructions:  Take 25 mg by mouth     Begin Date    AM    Noon    PM    Bedtime       sertraline 50 MG tablet   Commonly known as:  ZOLOFT   Refills:  0   Dose:  50 mg    Last time this was given:  50 mg on 2/17/2017  9:36 AM   Instructions:  Take 50 mg by mouth.     Begin Date    AM    Noon    PM    Bedtime            Where to Get Your Medications      You can get these medications from any pharmacy     Bring a paper prescription for each of these medications     dexamethasone 2 MG tablet    hydrocodone-acetaminophen 5-325mg 5-325 mg per tablet    lisinopril 20 MG tablet    pantoprazole 40 MG tablet                  Please bring to all follow up  appointments:    1. A copy of your discharge instructions.  2. All medicines you are currently taking in their original bottles.  3. Identification and insurance card.    Please arrive 15 minutes ahead of scheduled appointment time.    Please call 24 hours in advance if you must reschedule your appointment and/or time.          Discharge Instructions     Future Orders    Call MD for:  difficulty breathing or increased cough     Call MD for:  increased confusion or weakness     Call MD for:  persistent dizziness, light-headedness, or visual disturbances     Call MD for:  persistent nausea and vomiting or diarrhea     Call MD for:  redness, tenderness, or signs of infection (pain, swelling, redness, odor or green/yellow discharge around incision site)     Call MD for:  severe persistent headache     Call MD for:  severe uncontrolled pain     Call MD for:  temperature >100.4     Call MD for:  worsening rash     Diet general     Questions:    Total calories:      Fat restriction, if any:      Protein restriction, if any:      Na restriction, if any:      Fluid restriction:      Additional restrictions:          Discharge Instructions       Please follow ONLY the instructions that are checked below.    Activity Restrictions:  [x]  Return to work will be determined on an individual basis.  [x]  No lifting greater than 10 pounds.  [x]  No driving or operating machinery:  [x]  until cleared by your surgeon.  [x]  while taking narcotic pain medications or muscle relaxants.  [x]  Walk on paved surfaces only. It is okay to walk up and down stairs while holding onto a side rail.  [x]  No sexual activity for 2-3 weeks.    Discharge Medication/Follow-up:  [x]  Please refer to discharge medication reconciliation form.  [x]  Do not take ANY non-steroidal anti-inflammatory drugs (NSAIDS), including the following: ibuprofen, naprosyn, Aleve, Advil, Indocin, Mobic, or Celebrex for:  [x]  4 weeks  []  8 weeks  []  6 months  [x]   Prescriptions for appropriate medication will be given upon discharge.    [x]  Take docusate (Colace 100 mg): take one capsule a day as needed for constipation. You can get this over the counter.  [x]  Follow-up appointment:  [x]  2 weeks with in Neurosurgery clinic with repeat HCT               [x]  This Tuesday with Heme/Onc and Rad/Onc at [facility]     Wound Care:  [x]  Cranial incision: Keep open to air, Bacitracin to incision twice a day.  Abdominal incisions keep steri strips dry and intact until follow up  [x]  You may shower on the 2nd day after your surgery. Have the force of water hit you opposite from the cranial incision. Pat the incision dry after your shower; do not scrub the incision.      Call your doctor or go to the Emergency Room for any signs of infection, including: increased redness, drainage, pain, or fever (temperature ?101.5 for 24 hours). Call your doctor or go to the Emergency Room if there are any localized neurological changes; problems with speech, vision, numbness, tingling, weakness, or severe headache; or for other concerns.    Special Instructions:  [x]  No use of tobacco products.  [x]  Diet: Please eat a regular diet as tolerated.  []  Other diet:              Specific physician instructions:           Physicians need 3 days' notice for pain medicine to be refilled. Pain medicine will only be refilled between 8 AM and 5 PM, Monday through Friday, due to Food and Drug Administration regulation of documentation.    If you have any questions about this form, please call [phone].    Form No. 71530 (Revised 10/31/2013)        Primary Diagnosis     Your primary diagnosis was:  Brain Cancer      Admission Information     Date & Time Provider Department CSN    2/15/2017  3:07 AM Rajan Lawson MD [facility] [id number]      Care Providers     Provider Role Specialty Primary office phone    Rajan Lawson MD Attending Provider Neurosurgery [phone]     "Rajan Lawson MD Surgeon  Neurosurgery [phone]    Ernie Turner Jr., MD Consulting Physician  Radiation Oncology  [phone]      Your Vitals Were     BP Pulse Temp Resp Height Weight    134/63 (BP Location: Left leg, Patient Position: Lying, BP Method: Automatic) 90 98.2 °F (36.8 °C) (Oral) 18 4' 10" (1.473 m) 84.2 kg (185 lb 10 oz)    SpO2 BMI             95% 38.8 kg/m2         Recent Lab Values     No lab values to display.      Allergies as of 2/17/2017        Reactions    Azithromycin     SHAKES  SHAKES      Advance Directives     An advance directive is a document which, in the event you are no longer able to make decisions for yourself, tells your healthcare team what kind of treatment you do or do not want to receive, or who you would like to make those decisions for you.  If you do not currently have an advance directive, [facility] encourages you to create one.  For more information call:  [phone]), [phone] ([phone]),  or log on to [email or web address].        Language Assistance Services     ATTENTION: Language assistance services are available, free of charge. Please call [phone].      ATENCIÓN: Si habla español, tiene a renee disposición servicios gratuitos de asistencia lingüística. Llame al [phone].     CHÚ Ý: N?u b?n nói Ti?ng Vi?t, có các d?ch v? h? tr? ngôn ng? mi?n phí dành cho b?n. G?i s? [phone].        [facility] Sign-Up     Activating your [facility] account is as easy as 1-2-3!     1) Visit [email or web address], select Sign Up Now, enter this activation code and your date of birth, then select Next.  [email or web address]  Expires: 4/3/2017  5:07 PM      2) Create a username and password to use when you visit [facility] in the future and select a security question in case you lose your password and select Next.    3) Enter your e-mail address and click Sign Up!    Additional Information  If you have questions, please e-mail [email or web address] " or call [phone] to talk to our [facility] staff. Remember, [facility] is NOT to be used for urgent needs. For medical emergencies, dial 911.          [facility] complies with applicable Federal civil rights laws and does not discriminate on the basis of race, color, national origin, age, disability, or sex.

## 2017-02-15 NOTE — TRANSFER OF CARE
"Anesthesia Transfer of Care Note    Patient: Edgar Yang    Procedure(s) Performed: Procedure(s) (LRB):  SHUNT- with neuronavigation (Right)    Patient location: ICU    Anesthesia Type: general    Transport from OR: Transported from OR on room air with adequate spontaneous ventilation. Continuous ECG monitoring in transport. Continuous SpO2 monitoring in transport    Post pain: adequate analgesia    Post assessment: no apparent anesthetic complications and tolerated procedure well    Post vital signs: stable    Level of consciousness: awake, alert and oriented    Nausea/Vomiting: no nausea/vomiting    Complications: none          Last vitals:   Visit Vitals    BP (!) 149/70 (BP Location: Right leg, Patient Position: Lying, BP Method: Automatic)    Pulse (!) 121    Resp 20    Ht 4' 10" (1.473 m)    Wt 83.5 kg (184 lb)    SpO2 99%    BMI 38.46 kg/m2     "

## 2017-02-15 NOTE — ED PROVIDER NOTES
Encounter Date: 2/15/2017    SCRIBE #1 NOTE: I, Dr. Griggs, am scribing for, and in the presence of,  Meliton Torrez. I have scribed the entire note.       History     Chief Complaint   Patient presents with    Transfer- Hydrocephalus     Syncopal episode, hydrocephalus, hx of breast CA.     Review of patient's allergies indicates:  Allergies not on file  HPI Comments: Time patient was seen by the provider: 3:12 AM      The patient is a 36 y.o. female with hx of: breast CA and double mastectomy that presents to the ED as a transfer for neurosurgical evaluation with a complaint of intermittent headache, which began 3 days ago. Pt was found to have hydrocephalus on CT scan from outside facility. She reports a severe headache 1 day ago with a subsequent syncopal episode. She denies any numbness, weakness, nausea, vomiting, vision changes, or fever. Pt is not currently receiving chemotherapy.   The history is provided by the patient.     Past Medical History   Diagnosis Date    Anxiety     Breast CA     Chemotherapy induced neutropenia     Depression     Esophageal stricture     Gallstone     GERD (gastroesophageal reflux disease)     IBS (irritable bowel syndrome)     Left breast mass     Metastasis from breast cancer      Bone    Obesity, unspecified     Unspecified transient mental disorder due to conditions classified elsewhere      Past Medical History Pertinent Negatives   Diagnosis Date Noted    Autism spectrum disorder 2/15/2017     Past Surgical History   Procedure Laterality Date     section      Nasal endoscopy      Intertrochanteric hip fracture surgery      Portacath placement      Mastectomy Bilateral      History reviewed. No pertinent family history.  Social History   Substance Use Topics    Smoking status: Never Smoker    Smokeless tobacco: None    Alcohol use No     Review of Systems   Constitutional: Negative for fever.   HENT: Negative for sore throat.    Eyes: Negative for  visual disturbance.   Respiratory: Negative for shortness of breath.    Cardiovascular: Negative for chest pain.   Gastrointestinal: Negative for nausea and vomiting.   Genitourinary: Negative for dysuria.   Musculoskeletal: Negative for back pain.   Skin: Negative for rash.   Neurological: Positive for syncope and headaches. Negative for weakness and numbness.   Hematological: Does not bruise/bleed easily.       Physical Exam   Initial Vitals   BP Pulse Resp Temp SpO2   02/15/17 0305 02/15/17 0305 02/15/17 0305 -- 02/15/17 0305   139/84 82 18  100 %     Physical Exam    Nursing note and vitals reviewed.  Constitutional: She appears well-developed and well-nourished. She is not diaphoretic. No distress.   HENT:   Head: Normocephalic and atraumatic.   Eyes: EOM are normal. Pupils are equal, round, and reactive to light.   Neck: Normal range of motion. Neck supple.   Cardiovascular: Regular rhythm. Exam reveals no gallop and no friction rub.    No murmur heard.  Tachycardic   Pulmonary/Chest: Breath sounds normal. No respiratory distress. She has no wheezes. She has no rhonchi. She has no rales.   Abdominal: Soft. She exhibits no distension. There is no tenderness. There is no rebound and no guarding.   Musculoskeletal: Normal range of motion. She exhibits no edema or tenderness.   Neurological: She is alert and oriented to person, place, and time. She has normal strength. No cranial nerve deficit or sensory deficit.   Skin: Skin is warm and dry. No rash noted. No erythema.   Bilateral mastectomy scars         ED Course   Procedures  Labs Reviewed   CBC W/ AUTO DIFFERENTIAL - Abnormal; Notable for the following:        Result Value    RBC 3.99 (*)     Hemoglobin 10.4 (*)     Hematocrit 32.2 (*)     MCV 81 (*)     MCH 26.1 (*)     RDW 14.6 (*)     Gran% 74.5 (*)     All other components within normal limits   BASIC METABOLIC PANEL - Abnormal; Notable for the following:     CO2 22 (*)     BUN, Bld 5 (*)     All other  components within normal limits   PROTIME-INR   APTT             Medical Decision Making:   History:   Old Medical Records: I decided to obtain old medical records.  Initial Assessment:   My initial differential diagnoses include but are not limited to: hydrocephalus, metastatic breast cancer to brainm dehydration, and migraine. Pt with hydrocephalus on CT scan from outside hospital who was sent for further neurosurgical evaluation. Outside hospital labs: creatinine was 0.85, normal CBC. Her CT scan from outside hospital shows a mild hydrocephalus associated with modelled signal abnormality in the cerebellar region.   Independently Interpreted Test(s):   I have ordered and independently interpreted X-rays - see summary below.       <> Summary of X-Ray Reading(s): MRI head: mild hydrocephalus but multiple areas of mets  Clinical Tests:   Lab Tests: Reviewed  Radiological Study: Ordered and Reviewed  Other:   I discussed test(s) with the performing physician.       <> Summary of the Findings: MRI Head: mild hydrocephalus, multiple areas of mets  I have discussed this case with another health care provider.       <> Summary of the Discussion: Neurosurgery            Scribe Attestation:   Scribe #1: I performed the above scribed service and the documentation accurately describes the services I performed. I attest to the accuracy of the note.    Attending Attestation:           Physician Attestation for Scribe:  Physician Attestation Statement for Scribe #1: I, Dr. Griggs , reviewed documentation, as scribed by Meliton Torrez in my presence, and it is both accurate and complete.         Attending ED Notes:   Discussed with neurosurgery will obtain an MRI    Neurosurgery evaluated and is planning on admitting          ED Course     Clinical Impression:   The encounter diagnosis was Hydrocephalus.          Aleksandar Griggs III, MD  02/15/17 7833

## 2017-02-15 NOTE — OP NOTE
Surgery Date: 2/15/2017     Surgeon(s) and Role:     * Rajan Lawson MD - Primary     * Carroll Comer MD - Assisting    Pre-op Diagnosis:  Hydrocephalus [G91.9]    Post-op Diagnosis:  Hydrocephalus [G91.9]    Procedure(s) (LRB):  SHUNT- with neuronavigation (Right)    Anesthesia: General    PROCEDURE: The patient was placed under general anesthesia. The patient was   prepped and draped in the usual manner. The access to the peritoneum was gained  through epigastrium abdomen using Optiview trocar under direct vision.   Pneumoperitoneum to 15 mmHg with CO2 gas was attained. The shunt was in the   left upper quadrant. It was brought into the abdominal cavity on a mosquito. The suture passer was placed through the left upper quadrant under direct vision and pulled the shunt into the abdominal cavity to its full extent. The abdomen was inspected for hemostasis. Pneumoperitoneum was released. The trocar was removed. The skin incisions were closed with 4-0 plain catgut, and reinforced   with Mastisol, Steri-Strips, and Band-Aids. The patient tolerated the procedure  well, was brought to Recovery Room in stable condition. Sponge and needle   counts were correct at the end of the case.    PATHOLOGY:  for my part of the procedure is none.  COMPLICATIONS: None.  BLOOD LOSS: Minimal.

## 2017-02-15 NOTE — ED NOTES
Guille Waggoner (Brother-in-law): [phone]. Guille would like to be notified of test results if possible. Mrs. Yang approves this request.

## 2017-02-16 LAB
ANION GAP SERPL CALC-SCNC: 11 MMOL/L
BASOPHILS # BLD AUTO: 0 K/UL
BASOPHILS NFR BLD: 0 %
BUN SERPL-MCNC: 6 MG/DL
CALCIUM SERPL-MCNC: 9.3 MG/DL
CHLORIDE SERPL-SCNC: 109 MMOL/L
CO2 SERPL-SCNC: 18 MMOL/L
CREAT SERPL-MCNC: 0.7 MG/DL
DIFFERENTIAL METHOD: ABNORMAL
EOSINOPHIL # BLD AUTO: 0 K/UL
EOSINOPHIL NFR BLD: 0 %
ERYTHROCYTE [DISTWIDTH] IN BLOOD BY AUTOMATED COUNT: 14.5 %
EST. GFR  (AFRICAN AMERICAN): >60 ML/MIN/1.73 M^2
EST. GFR  (NON AFRICAN AMERICAN): >60 ML/MIN/1.73 M^2
GLUCOSE SERPL-MCNC: 132 MG/DL
HCT VFR BLD AUTO: 32.6 %
HGB BLD-MCNC: 10.4 G/DL
LYMPHOCYTES # BLD AUTO: 0.6 K/UL
LYMPHOCYTES NFR BLD: 10.7 %
MCH RBC QN AUTO: 26.1 PG
MCHC RBC AUTO-ENTMCNC: 31.9 %
MCV RBC AUTO: 82 FL
MONOCYTES # BLD AUTO: 0.1 K/UL
MONOCYTES NFR BLD: 1.7 %
NEUTROPHILS # BLD AUTO: 4.7 K/UL
NEUTROPHILS NFR BLD: 87.6 %
PLATELET # BLD AUTO: 294 K/UL
PMV BLD AUTO: 10 FL
POCT GLUCOSE: 126 MG/DL (ref 70–110)
POCT GLUCOSE: 153 MG/DL (ref 70–110)
POTASSIUM SERPL-SCNC: 4.3 MMOL/L
RBC # BLD AUTO: 3.99 M/UL
SODIUM SERPL-SCNC: 138 MMOL/L
WBC # BLD AUTO: 5.31 K/UL

## 2017-02-16 PROCEDURE — 85025 COMPLETE CBC W/AUTO DIFF WBC: CPT

## 2017-02-16 PROCEDURE — 63600175 PHARM REV CODE 636 W HCPCS: Performed by: PHYSICIAN ASSISTANT

## 2017-02-16 PROCEDURE — 99233 SBSQ HOSP IP/OBS HIGH 50: CPT | Mod: ,,, | Performed by: RADIOLOGY

## 2017-02-16 PROCEDURE — 25000003 PHARM REV CODE 250: Performed by: STUDENT IN AN ORGANIZED HEALTH CARE EDUCATION/TRAINING PROGRAM

## 2017-02-16 PROCEDURE — 80048 BASIC METABOLIC PNL TOTAL CA: CPT

## 2017-02-16 PROCEDURE — 25000003 PHARM REV CODE 250: Performed by: NEUROLOGICAL SURGERY

## 2017-02-16 PROCEDURE — 63600175 PHARM REV CODE 636 W HCPCS: Performed by: NEUROLOGICAL SURGERY

## 2017-02-16 PROCEDURE — 25000003 PHARM REV CODE 250: Performed by: NURSE PRACTITIONER

## 2017-02-16 PROCEDURE — 20600001 HC STEP DOWN PRIVATE ROOM

## 2017-02-16 PROCEDURE — 99233 SBSQ HOSP IP/OBS HIGH 50: CPT | Mod: ,,, | Performed by: INTERNAL MEDICINE

## 2017-02-16 PROCEDURE — 99232 SBSQ HOSP IP/OBS MODERATE 35: CPT | Mod: ,,, | Performed by: NURSE PRACTITIONER

## 2017-02-16 RX ORDER — INSULIN ASPART 100 [IU]/ML
1-10 INJECTION, SOLUTION INTRAVENOUS; SUBCUTANEOUS
Status: DISCONTINUED | OUTPATIENT
Start: 2017-02-16 | End: 2017-02-17 | Stop reason: HOSPADM

## 2017-02-16 RX ORDER — POLYETHYLENE GLYCOL 3350 17 G/17G
17 POWDER, FOR SOLUTION ORAL DAILY
Status: DISCONTINUED | OUTPATIENT
Start: 2017-02-16 | End: 2017-02-17 | Stop reason: HOSPADM

## 2017-02-16 RX ORDER — HEPARIN SODIUM 5000 [USP'U]/ML
5000 INJECTION, SOLUTION INTRAVENOUS; SUBCUTANEOUS EVERY 8 HOURS
Status: DISCONTINUED | OUTPATIENT
Start: 2017-02-17 | End: 2017-02-17 | Stop reason: HOSPADM

## 2017-02-16 RX ORDER — GLUCAGON 1 MG
1 KIT INJECTION
Status: DISCONTINUED | OUTPATIENT
Start: 2017-02-16 | End: 2017-02-17 | Stop reason: HOSPADM

## 2017-02-16 RX ORDER — IBUPROFEN 200 MG
24 TABLET ORAL
Status: DISCONTINUED | OUTPATIENT
Start: 2017-02-16 | End: 2017-02-17 | Stop reason: HOSPADM

## 2017-02-16 RX ORDER — LABETALOL HYDROCHLORIDE 5 MG/ML
10 INJECTION, SOLUTION INTRAVENOUS EVERY 30 MIN PRN
Status: DISCONTINUED | OUTPATIENT
Start: 2017-02-16 | End: 2017-02-17 | Stop reason: HOSPADM

## 2017-02-16 RX ORDER — AMOXICILLIN 250 MG
1 CAPSULE ORAL DAILY
Status: DISCONTINUED | OUTPATIENT
Start: 2017-02-16 | End: 2017-02-17 | Stop reason: HOSPADM

## 2017-02-16 RX ORDER — ALPRAZOLAM 1 MG/1
1 TABLET ORAL 2 TIMES DAILY PRN
Status: DISCONTINUED | OUTPATIENT
Start: 2017-02-16 | End: 2017-02-17 | Stop reason: HOSPADM

## 2017-02-16 RX ORDER — LISINOPRIL 20 MG/1
20 TABLET ORAL DAILY
Status: DISCONTINUED | OUTPATIENT
Start: 2017-02-17 | End: 2017-02-16

## 2017-02-16 RX ORDER — LISINOPRIL 20 MG/1
20 TABLET ORAL DAILY
Status: DISCONTINUED | OUTPATIENT
Start: 2017-02-16 | End: 2017-02-17 | Stop reason: HOSPADM

## 2017-02-16 RX ORDER — IBUPROFEN 200 MG
16 TABLET ORAL
Status: DISCONTINUED | OUTPATIENT
Start: 2017-02-16 | End: 2017-02-17 | Stop reason: HOSPADM

## 2017-02-16 RX ADMIN — DEXAMETHASONE SODIUM PHOSPHATE 4 MG: 4 INJECTION, SOLUTION INTRAMUSCULAR; INTRAVENOUS at 06:02

## 2017-02-16 RX ADMIN — CEFAZOLIN SODIUM 1 G: 1 SOLUTION INTRAVENOUS at 05:02

## 2017-02-16 RX ADMIN — BACLOFEN 20 MG: 10 TABLET ORAL at 05:02

## 2017-02-16 RX ADMIN — BACLOFEN 20 MG: 10 TABLET ORAL at 02:02

## 2017-02-16 RX ADMIN — ALPRAZOLAM 1 MG: 1 TABLET ORAL at 09:02

## 2017-02-16 RX ADMIN — DEXAMETHASONE SODIUM PHOSPHATE 4 MG: 4 INJECTION, SOLUTION INTRAMUSCULAR; INTRAVENOUS at 12:02

## 2017-02-16 RX ADMIN — DEXAMETHASONE SODIUM PHOSPHATE 4 MG: 4 INJECTION, SOLUTION INTRAMUSCULAR; INTRAVENOUS at 11:02

## 2017-02-16 RX ADMIN — BACLOFEN 20 MG: 10 TABLET ORAL at 09:02

## 2017-02-16 RX ADMIN — DEXAMETHASONE SODIUM PHOSPHATE 4 MG: 4 INJECTION, SOLUTION INTRAMUSCULAR; INTRAVENOUS at 05:02

## 2017-02-16 RX ADMIN — SERTRALINE HYDROCHLORIDE 50 MG: 50 TABLET ORAL at 09:02

## 2017-02-16 RX ADMIN — LABETALOL HYDROCHLORIDE 10 MG: 5 INJECTION, SOLUTION INTRAVENOUS at 12:02

## 2017-02-16 RX ADMIN — LISINOPRIL 20 MG: 20 TABLET ORAL at 11:02

## 2017-02-16 RX ADMIN — HYDROCODONE BITARTRATE AND ACETAMINOPHEN 1 TABLET: 5; 325 TABLET ORAL at 01:02

## 2017-02-16 RX ADMIN — ONDANSETRON 8 MG: 4 TABLET, FILM COATED ORAL at 01:02

## 2017-02-16 NOTE — PROGRESS NOTES
ICU Progress Note  Neurocritical Care    Admit Date: 2/15/2017  LOS: 1    CC: <principal problem not specified>    Code Status: Full Code     SUBJECTIVE:     Interval History/Significant Events:   Ms Yang is a 36 y.o. female with a history of stage IV breast cancer with bone metastasis to the hip diagnosed June 2016 s/p double mastectomy soon after with chemo last done 4 months ago, no radiation.  For the last 3 days reported having HA, which got progressively worse. She has a 1 day history of gait unsteadiness and falls. She took aspirin this morning for her headache. She was transferred [facility] for neurosurgical evaluation after CT of the head showed cerebellar hypodensities and mild to moderate hydrocephalus.     Review of Symptoms: negative  Constitutional: Denies fevers or chills.  Pulmonary: Denies shortness of breath or cough.  Cardiology: Denies chest pain or palpitations.  GI: Denies abdominal pain or constipation.  Neurologic: Denies new weakness, headaches, or paresthesias.     Medications:  Continuous Infusions:   Scheduled Meds:   baclofen  20 mg Oral TID    dexamethasone  4 mg Intravenous Q6H    [START ON 2/17/2017] heparin (porcine)  5,000 Units Subcutaneous Q8H    lisinopril  20 mg Oral Daily    polyethylene glycol  17 g Oral Daily    scopolamine  1 patch Transdermal Q3 Days    senna-docusate 8.6-50 mg  1 tablet Oral Daily    sertraline  50 mg Oral Daily     PRN Meds:.acetaminophen, acetaminophen, alprazolam, alprazolam, dextrose 50%, dextrose 50%, glucagon (human recombinant), glucose, glucose, hydrocodone-acetaminophen 5-325mg, insulin aspart, labetalol, ondansetron, promethazine, sodium chloride 0.9%    OBJECTIVE:   Vital Signs (Most Recent):   Temp: 97.8 °F (36.6 °C) (02/16/17 0705)  Pulse: 95 (02/16/17 1000)  Resp: (!) 38 (02/16/17 1000)  BP: (!) 169/81 (02/16/17 1108)  SpO2: 97 % (02/16/17 1000)    Vital Signs (24h Range):   Temp:  [97.7 °F (36.5 °C)-98.9 °F (37.2 °C)] 97.8 °F (36.6  °C)  Pulse:  [] 95  Resp:  [13-40] 38  SpO2:  [94 %-99 %] 97 %  BP: (126-169)/(59-81) 169/81    ICP/CPP (Last 24h):        I & O (Last 24h):   Intake/Output Summary (Last 24 hours) at 02/16/17 1118  Last data filed at 02/16/17 1000   Gross per 24 hour   Intake          3413.33 ml   Output             3060 ml   Net           353.33 ml     Physical Exam:  GA: Alert, comfortable, no acute distress.   HEENT: No scleral icterus or JVD.   Pulmonary: Clear to auscultation A/P/L. No wheezing, crackles, or rhonchi.  Cardiac: RRR S1 & S2 w/o rubs/murmurs/gallops.   Abdominal: Bowel sounds present x 4. No appreciable hepatosplenomegaly.  Skin: No jaundice, rashes, or visible lesions.  Neuro:  --GCS: E4 V5 M6  --Mental Status: AAOx4 cooperative, following commands  --CN II-XII grossly intact.   --Pupils 3mm, PERRL.   --Corneal reflex, gag, cough intact.  --LUE strength: 5/5  --RUE strength: 5/5  --LLE strength: 5/5  --RLE strength: 5/5    Vent Data:        Lines/Drains/Airway:          Port A Cath Single Lumen right subclavian (Active)   Dressing Type Transparent 2/16/2017  7:05 AM   Dressing Status Clean;Dry;Intact 2/16/2017  7:05 AM   Dressing Intervention Dressing reinforced 2/16/2017  3:05 AM   Line Status Blood return noted;Infusing 2/16/2017  7:05 AM   Flush Performed Yes 2/16/2017  7:05 AM   Daily Line Review Performed 2/16/2017  7:05 AM   Type of Needle Saldaña 2/16/2017  7:05 AM   Needle Status In place on arrival 2/16/2017  7:05 AM         Nutrition/Tube Feeds (if NPO state why): regular diet    Labs:  ABG: No results for input(s): PH, PO2, PCO2, HCO3, POCSATURATED, BE in the last 24 hours.  BMP:  Recent Labs  Lab 02/16/17  0230      K 4.3      CO2 18*   BUN 6   CREATININE 0.7   *     LFT: No results found for: AST, ALT, GGT, ALKPHOS, BILITOT, ALBUMIN, PROT  CBC:   Lab Results   Component Value Date    WBC 5.31 02/16/2017    HGB 10.4 (L) 02/16/2017    HCT 32.6 (L) 02/16/2017    MCV 82 02/16/2017      2017     Microbiology x 7d:   Microbiology Results (last 7 days)     ** No results found for the last 168 hours. **        Imagin/16 CT Head  1. Interval placement of right parietal approach  shunt catheter with tip terminating left lateral ventricle. The ventricular system appears intervally decreased in caliber noting a somewhat slitlike configuration on today's examination. This can be seen in the setting of over shunting and clinical correlation is advised.  2. Abnormal appearance of the bilateral cerebellar hemispheres and cerebellar tonsils with multiple regions of hypoattenuation compatible with vasogenic edema relating to multiple enhancing lesions seen on prior MRI. There is continued crowding of posterior fossa, downward displacement of cerebellar tonsils, and effacement of the inferior fourth ventricle/obex. This is better appreciated on prior dedicated MRI examination. Continued followup is advised.    2/15 CXR  A series of radiographs was obtained to evaluate the extracranial portion of the patient's  shunt.  The shunt appears intact, with no focal areas of discontinuity noted, the shunt tip located in the left mid abdomen.  Note is also made of a vascular catheter entering from a right subclavian approach, its tip near the junction of the superior vena cava and right atrium, and of the presence of surgical clips in the axillary soft tissues on both sides.      I personally reviewed the above image.    ASSESSMENT/PLAN:     Active Hospital Problems    Diagnosis    Hydrocephalus      Neuro: S/P VPS placement POD#1  NSGY following  Neuro checks Q1H  Neurontin 300mg tid x48H  Decadron 4mg Q6h  Baclofen 20mg q8h  Sertraline 50mg daily  Norco prn pain  Tylenol prn pain, fever  Xanax 1mg bid,prn anxiety    Pulmonary:   On room air O2 Sats 97%  If distress, prn CXR/ABG    Cardiac: HTN  SBP goal <160  Lisinopril 20mg daily  Prn labetalol    Renal:    I/O 24H net +1043  BUN/Cr  WNL  Follow CMP     ID:   Afebrile  No leukocytosis  Ancef  1 g Q12, post-op    Hem/Onc: Normocytic anemia  H/H stable  Plts WNL  Follow CBC    Endocrine:    accuchecks Qac&hs with SSI  A1C 5.1    Fluids/Electrolytes/Nutrition/GI:   Regular diet  IVF dcd  Replace electrolytes prn    Lines:  portacath  Art  CVC  ETT  Melo 2/15  NG  PEG    Proph:  DVT:sc heparin start 2/17  Constipation:miralax   Last output:  PUP:protonix  VAP:      Uninterrupted Critical Care/Counseling Time (not including procedures): level 2    Carri Burton [facility]  Neurocritical care

## 2017-02-16 NOTE — PROGRESS NOTES
Progress Note  Neurosurgery    Admit Date: 2/15/2017  Post-operative Day: 1 Day Post-Op  Hospital Day: 2    SUBJECTIVE:     Edgar Yang is a 36 y.o. female with history of metastatic breast cancer and now s/p  shunt for hydrocephalus.  Follow-up For:  Procedure(s) (LRB):  SHUNT- with neuronavigation (Right)     Denies HA this am.      Scheduled Meds:   baclofen  20 mg Oral TID    dexamethasone  4 mg Intravenous Q6H    [START ON 2/17/2017] heparin (porcine)  5,000 Units Subcutaneous Q8H    polyethylene glycol  17 g Oral Daily    scopolamine  1 patch Transdermal Q3 Days    senna-docusate 8.6-50 mg  1 tablet Oral Daily    sertraline  50 mg Oral Daily     Continuous Infusions:   PRN Meds:acetaminophen, acetaminophen, alprazolam, alprazolam, dextrose 50%, dextrose 50%, glucagon (human recombinant), glucose, glucose, hydrocodone-acetaminophen 5-325mg, insulin aspart, ondansetron, promethazine, sodium chloride 0.9%    Review of patient's allergies indicates:   Allergen Reactions    Azithromycin      SHAKES  SHAKES       OBJECTIVE:     Vital Signs (Most Recent)  Temp: 97.8 °F (36.6 °C) (02/16/17 0705)  Pulse: 106 (02/16/17 0800)  Resp: (!) 29 (02/16/17 0800)  BP: (!) 157/72 (02/16/17 0800)  SpO2: 96 % (02/16/17 0800)    Vital Signs Range (Last 24H):  Temp:  [97.7 °F (36.5 °C)-98.9 °F (37.2 °C)]   Pulse:  []   Resp:  [13-40]   BP: (126-161)/(59-76)   SpO2:  [94 %-99 %]     I & O (Last 24H):  Intake/Output Summary (Last 24 hours) at 02/16/17 1010  Last data filed at 02/16/17 0800   Gross per 24 hour   Intake          3308.33 ml   Output             2510 ml   Net           798.33 ml     Physical Exam:  AAOX3, speech fluent  PERRL, EOMI, face symm, tongue midline  5/5 in BUE/BLE  SILT  Reflexes symm  No drift  No nystagmus    Lines/Drains:       Port A Cath Single Lumen right subclavian (Active)   Dressing Type Transparent 2/16/2017  7:05 AM   Dressing Status Clean;Dry;Intact 2/16/2017  7:05 AM   Dressing  "Intervention Dressing reinforced 2/16/2017  3:05 AM   Line Status Blood return noted;Infusing 2/16/2017  7:05 AM   Flush Performed Yes 2/16/2017  7:05 AM   Daily Line Review Performed 2/16/2017  7:05 AM   Type of Needle Saldaña 2/16/2017  7:05 AM   Needle Status In place on arrival 2/16/2017  7:05 AM   Number of days:            Urethral Catheter 02/15/17 1320 Non-latex;Straight-tip 16 Fr. (Active)   Site Assessment Clean;Intact 2/16/2017  7:05 AM   Collection Container Urimeter 2/16/2017  7:05 AM   Securement Method secured to top of thigh w/ adhesive device 2/16/2017  7:05 AM   Catheter Care Performed yes 2/16/2017  7:05 AM   Reason for Continuing Urinary Catheterization Post operative 2/16/2017  7:05 AM   CAUTI Prevention Bundle StatLock in place w 1" slack;Intact seal between catheter & drainage tubing;Drainage bag off the floor;Green sheeting clip in use;No dependent loops or kinks;Drainage bag not overfilled (<2/3 full);Drainage bag below bladder 2/15/2017  7:05 PM   Output (mL) 100 mL 2/16/2017  8:00 AM   Number of days:0       Wound/Incision:  Dressed w/o drainage    Laboratory:  CBC:   Recent Labs  Lab 02/16/17  0229   WBC 5.31   RBC 3.99*   HGB 10.4*   HCT 32.6*      MCV 82   MCH 26.1*   MCHC 31.9*     CMP:   Recent Labs  Lab 02/16/17  0230   *   CALCIUM 9.3      K 4.3   CO2 18*      BUN 6   CREATININE 0.7     Coagulation:   Recent Labs  Lab 02/15/17  0728   INR 1.0   APTT 24.2     Cardiac markers: No results for input(s): CKMB, CPKMB, TROPONINT, TROPONINI, MYOGLOBIN in the last 168 hours.  No results for input(s): COLORU, CLARITYU, SPECGRAV, PHUR, PROTEINUA, GLUCOSEU, BILIRUBINCON, BLOODU, WBCU, RBCU, BACTERIA, MUCUS, NITRITE, LEUKOCYTESUR, UROBILINOGEN, HYALINECASTS in the last 168 hours.      Diagnostic Results:  CT head and shunt series: reviewed    ASSESSMENT/PLAN:     Assessment: 36 F with history of metastatic breast cancer now s/p  shunt for hydrocephalus.  -Pt neuro " stable  -Postop imaging stable  -Consider TTF today  -Hemonc/radonc consult  -ADAT, replete [place] TAVAREZN

## 2017-02-16 NOTE — PROGRESS NOTES
Pt states she is not pregnant. She has not had intercourse in over 1 year due to complications from cancer therapy. Pt also states she was given depot injection to prevent pregnancy.

## 2017-02-16 NOTE — OP NOTE
DATE OF PROCEDURE:  02/15/2017    PREOPERATIVE DIAGNOSES:  1.  Hydrocephalus.  2.  Multiple intracranial metastases, supratentorial and infratentorial.  3.  History of metastatic breast cancer.    POSTOPERATIVE DIAGNOSES:  1.  Hydrocephalus.  2.  Multiple intracranial metastases, supratentorial and infratentorial.  3.  History of metastatic breast cancer.    PROCEDURES PERFORMED:  1.  Placement of a right ventriculoperitoneal shunt.  2.  Use of AxiEM navigation.  3.  Endoscopic placement of the distal shunt catheter into the peritoneal space,   performed by General Surgery.    PRIMARY SURGEON:  Rajan Lawson M.D.    CO-SURGEON:  Dr. Comer from the General Surgery Service.    ANESTHESIA:  GETA.    BLOOD LOSS:  Minimal.    COMPLICATIONS:  None.    DRAINS:  None.    SPECIMEN SENT:  CSF sent for cytology.    FINDINGS:  Spontaneous CSF flow through the distal catheter.    INDICATIONS:  This is a 36-year-old woman with a history of metastatic breast   cancer, status post double mastectomy and chemotherapy, who presented to the   Emergency Room with one-day of headache and gait instability.  Imaging revealed   multiple intracranial metastases, primarily in the infratentorial space.  There   was a large lesion involving the right cerebellar tonsil with mass effect on the   fourth ventricle causing early hydrocephalus.  Given the early hydrocephalus,   the recommendation was for placement of a right ventriculoperitoneal shunt.    Risks, benefits, alternatives, indications and methods were reviewed in detail   with the patient and she wished to proceed.  All questions were answered.  No   guarantees about the results of the procedure were made.    OPERATIVE NOTE:  The patient was brought into the Operating Room, where she was   intubated and placed under general anesthesia without difficulty.  All lines   were placed without difficulty.  She was positioned in left lateral gaze with   her head resting on a horseshoe  headholder.  The AxiEM neuronavigation system   was then registered.  The system was checked against external anatomic landmarks   and found to be accurate.  The probe was used to plan the incision over the   right parietooccipital scalp.  Hair over the parietal scalp was clipped and the   area was marked.  The abdomen was also marked in the midline for distal catheter   placement.  The area was prepped and draped in the usual sterile fashion.  A   timeout was performed prior to the procedure.  A 10 mL of lidocaine with   epinephrine were injected into the skin.    A curvilinear incision was made through the skin down to the pericranium.  A   subgaleal pocket was opened bluntly in the nuchal fascia.  A hole in the   pericranium was made with the Bovie electrocautery at our planned entry site.  A   bur hole was made in that location with an M8 drill bit.  Fragments of the   inner table were removed with the curettes and Kerrison punches.  Attention was   then turned to tunneling of the distal catheter.  A shunt passer was placed from   the cranial incision to the region of the mid abdomen.  An incision was made   over the shunt passer in the mid abdomen using a #10 blade.  Bovie   electrocautery was used for subcutaneous dissection down to the shunt catheter.    The distal catheter was then connected to the Certas valve set at 4.  It was   secured with a silk suture and the distal catheter was passed from the cranial   incision to the abdominal incision.  The shunt passer system was completely   removed.  Attention was then turned to placement of the proximal catheter.  The   dura was opened with an 11 blade and the dural leaflets were cauterized with   bipolar electrocautery.  Using AxiEM navigation probe, a 9 cm Bactiseal catheter   was introduced into the right lateral ventricle.  Spontaneous CSF flow was   found at a depth of 5 and the catheter was soft passed to a depth of 9.  The   proximal catheter was then  connected to a right angle Medtronic reservoir and   secured with a silk suture.  The valve was then connected to the right angle   reservoir with an intersecting piece of Bactiseal catheter secured with silk   sutures.  Traction was placed on the distal catheter at the abdominal incision   to remove any kinks in the system.  Spontaneous CSF flow was seen out of the   distal catheter.  The cranial wound was irrigated with sterile normal saline.    Vancomycin and gentamicin antibiotic was introduced into the reservoir.  The   cranial incision was closed at the galea with interrupted inverted 3-0 Vicryl   sutures.  The skin was closed with staples.  Bacitracin was placed over top.    Placement of the distal catheter into the peritoneal space was performed by Dr. Comer using an endoscopic approach.  For details about this portion of the   procedure, please see his separate operative dictation.    The patient appeared to tolerate the procedure well.  I was present for   all critical portions.  At the end of the case, all counts were correct.  She   was repositioned supine on the hospital bed where she was extubated and allowed   to emerge from anesthesia without difficulty.  She was sent to the PACU in   stable condition for recovery.      BELEN  dd: 02/15/2017 15:37:11 (CST)  td: 02/15/2017 22:33:57 (CST)  Doc ID   #[id number]  Job ID #[id number]    CC:

## 2017-02-16 NOTE — PLAN OF CARE
Problem: Patient Care Overview  Goal: Plan of Care Review  Outcome: Ongoing (interventions implemented as appropriate)  POC reviewed with pt at 0500. Pt verbalized understanding. Questions and concerns addressed. Pt taken for CT scan overnight. No acute events during trip or overnight. Pt progressing toward goals. Will continue to monitor. See flowsheets for full assessment and VS info

## 2017-02-16 NOTE — CONSULTS
Inpatient consult to Radiation Oncology  Consult performed by: DENISE ZAVALA JR  Consult ordered by: NEERU CANAS      Reason for Consultation:  Metastatic breast cancer.     History of Present Illness:  Mrs. Yang' history dates back to June of 2016 when she presented to her physicians at [facility] with a  possible mastitis of the Lt. breast. Further evaluation revealed a Lt. breast mass and punch biopsy revealed infiltrating ductal carcinoma. The tumor was ER, WV and Her - 2 negative.  Further work up revealed metastatic disease to hip.  Clinical stage was T4d, N2b, M1.  The patient received systemic chemotherapy with TAC followed by bilateral mastectomies.  Pathology revealed a 2.5 cm focus of residual disease in the Lt. breast and one positive axillary node.  The patient was being evaluated for possible post mastectomy irradiation. Of note, she is BRCA 1 positive from her father.  The patient was doing well until recently when she noted headache for past 3 days and one day history of gait imbalance and falls.  Outside CT revealed hypodensities in the cerebellum.  She was referred to [facility] for neurosurgical valuation.  MRI revealed innumerable enhancing lesions identified throughout the bilateral cerebellar hemispheres with surrounding vasogenic edema. The largest lesion is identified within the right parasagittal cerebellar tonsil measuring approximately 2.0 x 1.3 x 1.7 cm (AP by transverse by craniocaudal dimensions). There is downward displacement of the cerebellar tonsils with associated crowding of the posterior fossa. There is mass effect and effacement of the inferior aspect of the fourth ventricle/obex as well as the foramen of Magendie. There is resulting mild prominence of the supratentorial ventricular system. There are additional supratentorial small enhancing lesions identified throughout the bilateral cerebral hemispheres predominantly at the cortical gray-white matter junction.  Note is made of a small 6-mm enhancing lesion within the right putamen. There is no evidence of associated hemorrhage.  The patient underwent placement of a  shunt yesterday.  We are consulted for discussion of radiotherapy.  Today, the patient states she feels much better.  Denies headaches or imbalance.  No other complaints.      GYN history:   (one ); menarche age 9, first/only live birth age 27; tried breastfeeding, +depo shots in the past, received one dose of lupron; Family history is positive for breast cancer in paternal aunt.  Father and at least 2 of 3 sisters are BRCA 1 positive.  Sisters are undergoing prophylactic mastectomies.     Review of patient's allergies indicates:   Allergen Reactions    Azithromycin  Shakes         Past Medical History   Diagnosis Date    Anxiety      Breast CA      Chemotherapy induced neutropenia      Depression      Esophageal stricture      Gallstone      GERD (gastroesophageal reflux disease)      IBS (irritable bowel syndrome)      Left breast mass      Metastasis from breast cancer         Bone    Obesity, unspecified      Unspecified transient mental disorder due to conditions classified elsewhere        Past Surgical History   Procedure Laterality Date     section        Nasal endoscopy        Intertrochanteric hip fracture surgery        Portacath placement        Mastectomy Bilateral          Social History   Substance Use Topics    Smoking status: Never Smoker    Smokeless tobacco: None    Alcohol use No         Review of Systems:  12 point ROS negative     OBJECTIVE:         Physical Exam:  -Alert and oriented x4, The patient is very talkative, normal affect.    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  I had a long discussion with the patient and her .  We discussed the rational for consideration of radiotherapy.  We discussed the methods of radiotherapy including whole brain irradiation and stereo tactic radiosurgery.  Given her level of involvement, we recommended whole brain irradiation followed  by a boost to the cerebellum with external beam.  Discussed the acute and long term side effects of radiotherapy.  The patient is agreeable to proceeding with therapy.  She has been seen recently in the Radiation Oncology department at [facility].  Following discharge she will follow up with them on Monday to arrange the start of her therapy.

## 2017-02-16 NOTE — PLAN OF CARE
02/16/17 1336   Discharge Assessment   Assessment Type Discharge Planning Assessment   Confirmed/corrected address and phone number on facesheet? Yes   Assessment information obtained from? Patient   Expected Length of Stay (days) 4   Communicated expected length of stay with patient/caregiver yes   Prior to hospitilization cognitive status: Alert/Oriented   Prior to hospitalization functional status: Needs Assistance  (per patient, she has days she requires assistance from  with ADLS)   Current cognitive status: Alert/Oriented   Current Functional Status: Needs Assistance   Arrived From admitted as an inpatient   Lives With spouse   Able to Return to Prior Arrangements unable to determine at this time (comments)   Is patient able to care for self after discharge? Unable to determine at this time (comments)   How many people do you have in your home that can help with your care after discharge? 1   Who are your caregiver(s) and their phone number(s)? Yeyo Yang ()  [phone]   Patient's perception of discharge disposition home or selfcare   Readmission Within The Last 30 Days no previous admission in last 30 days   Patient currently being followed by outpatient case management? No   Patient currently receives home health services? No   Does the patient currently use HME? No   Patient currently receives private duty nursing? N/A   Patient currently receives any other outside agency services? No   Equipment Currently Used at Home none   Do you have any problems affording any of your prescribed medications? No   Is the patient taking medications as prescribed? yes   Do you have any financial concerns preventing you from receiving the healthcare you need? No   Does the patient have transportation to healthcare appointments? Yes   Transportation Available family or friend will provide   On Dialysis? No   Does the patient receive services at the Coumadin Clinic? No   Are there any open cases? No    Discharge Plan A Home with family   Discharge Plan B Home with family;Home Health   Patient/Family In Agreement With Plan yes       PCP:  Nataliia Poole MD  Per patient, she wants to change the above PCP and will let CM know her choice before dishcarge.       Pharmacy:    [id number]  [place], [place] - [address] AT [facility] [address]   [address]  [place] [address]  Phone: [phone] Fax: [phone]        Emergency Contacts:  Extended Emergency Contact Information  Primary Emergency Contact: Yeyo Yang  Address: [address] [place] [address] [facility]  Home Phone: [phone]  Mobile Phone: [phone]  Relation: Spouse  Secondary Emergency Contact: Jasbir Ling   [facility]  Home Phone: [phone]  Mobile Phone: [phone]  Relation: Father      Insurance:  Payor: GENERIC COMMERCIAL / Plan: GENERIC COMMERCIAL / Product Type: Commercial /       Laquita Su RN, CCRN-K, [facility]  Neuro-Critical Care   X [phone]

## 2017-02-16 NOTE — PLAN OF CARE
Problem: Patient Care Overview  Goal: Plan of Care Review  Outcome: Ongoing (interventions implemented as appropriate)  POC reviewed with pt and family at 1000. Pt verbalized understanding. Questions and concerns addressing future cancer plans explained with MD's. No acute events today. Pt progressing toward goals. Will continue to monitor. See flowsheets for full assessment and VS info.

## 2017-02-16 NOTE — PLAN OF CARE
Problem: Patient Care Overview  Goal: Plan of Care Review  Outcome: Ongoing (interventions implemented as appropriate)  POC reviewed with pt and family at 1620. Pt verbalized understanding. Questions and concerns addressing  shunt. No acute events today. Pt progressing toward goals. Will continue to monitor. See flowsheets for full assessment and VS info. MD spoke to pt after extubation. No problems with surgery arrived to unit at 1620. Neuro alert orientedx4, on room air. Pt progressing well.

## 2017-02-16 NOTE — H&P
ICU Progress Note  Neurocritical Care    Admit Date: 2/15/2017  LOS: 0    CC: <principal problem not specified>    Code Status: Full Code     SUBJECTIVE:     Interval History/Significant Events:  S/P VPS placement without any complications. No significant pain.       HPI:  As per NSU house staff, Ms Yang is a 36 y.o. female with a history of stage IV breast cancer with bone metastasis to the hip diagnosed June 2016 s/p double mastectomy soon after with chemo last done 4 months ago, no radiation.     For the last 3 days reported having HA, which got progressively worse. She has a 1 day history of gait unsteadiness and falls. She took aspirin this morning for her headache. She was transferred from [facility] for neurosurgical evaluation after CT of the head showed cerebellar hypodensities and mild to moderate hydrocephalus.     Review of Symptoms:    ROS   No symptoms.  Medications:  Continuous Infusions:   sodium chloride 0.9% 100 mL/hr at 02/15/17 2105     Scheduled Meds:   baclofen  20 mg Oral TID    ceFAZolin (ANCEF) IVPB  1 g Intravenous Q12H    dexamethasone  4 mg Intravenous Q6H    [START ON 2/16/2017] pantoprazole  40 mg Intravenous Before breakfast    scopolamine  1 patch Transdermal Q3 Days    sertraline  50 mg Oral Daily     PRN Meds:.acetaminophen, acetaminophen, alprazolam, hydrocodone-acetaminophen 5-325mg, ondansetron, promethazine, sodium chloride 0.9%    OBJECTIVE:   Vital Signs (Most Recent):   Temp: 97.7 °F (36.5 °C) (02/15/17 1620)  Pulse: (!) 55 (02/15/17 2105)  Resp: (!) 27 (02/15/17 2105)  BP: (!) 144/65 (02/15/17 2105)  SpO2: 96 % (02/15/17 2105)    Vital Signs (24h Range):   Temp:  [97.7 °F (36.5 °C)] 97.7 °F (36.5 °C)  Pulse:  [] 55  Resp:  [13-29] 27  SpO2:  [96 %-100 %] 96 %  BP: (126-160)/(60-84) 144/65    ICP/CPP (Last 24h):        I & O (Last 24h):   Intake/Output Summary (Last 24 hours) at 02/15/17 2153  Last data filed at 02/15/17 2105   Gross per 24 hour   Intake           "2358.33 ml   Output             1125 ml   Net          1233.33 ml     Physical Exam:  Physical Exam   General   HEENT:   Chest Heart RRR / Lungs Clear to auscultation  Adbomen: Soft nontender + BS  Extremities: OK distal pulses.  Skin:   Neurological Exam:  MS; Alert, oriented to P/T/P/R, No language abnormalities. Normal mood.  CN: II-XII   Motor: LUE 5/5 / RUE 5 /5 Tone normal bilaterally  LLE 5/5 / RLE 5/5 Tone normal bilaterally  Sensory: LT/PP/T/ Vibration   Complex sensory modalities: not tested  DTR: normal throughout.  Coordination /Fine motor: FN and HS OK.  Gait: Not tested.  Meningeal signs: Absent  Vent Data:        Lines/Drains/Airway:          Port A Cath Single Lumen right subclavian (Active)   Dressing Type Transparent 2/15/2017  4:20 PM   Dressing Status Clean;Dry;Intact 2/15/2017  4:20 PM   Dressing Intervention New dressing 2/15/2017 12:25 PM   Line Status Blood return noted;Infusing 2/15/2017  4:20 PM   Flush Performed Yes 2/15/2017  4:20 PM   Daily Line Review Performed 2/15/2017  4:20 PM   Type of Needle Saldaña 2/15/2017  4:20 PM   Needle Status In place on arrival 2/15/2017  4:20 PM             Urethral Catheter 02/15/17 1320 Non-latex;Straight-tip 16 Fr. (Active)   Site Assessment Clean;Intact 2/15/2017  4:20 PM   Collection Container Urimeter 2/15/2017  4:20 PM   Securement Method secured to top of thigh w/ adhesive device 2/15/2017  4:20 PM   Catheter Care Performed yes 2/15/2017  4:20 PM   Reason for Continuing Urinary Catheterization Post operative 2/15/2017  4:20 PM   CAUTI Prevention Bundle StatLock in place w 1" slack;Intact seal between catheter & drainage tubing;Drainage bag off the floor;Green sheeting clip in use;No dependent loops or kinks;Drainage bag not overfilled (<2/3 full);Drainage bag below bladder 2/15/2017  4:20 PM   Output (mL) 220 mL 2/15/2017  9:05 PM     Nutrition/Tube Feeds (if NPO state why): PO diet.    Labs:  ABG: No results for input(s): PH, PO2, PCO2, " HCO3, POCSATURATED, BE in the last 24 hours.  BMP:  Recent Labs  Lab 02/15/17  0909      K 3.9      CO2 22*   BUN 5*   CREATININE 0.8   GLU 97     LFT: No results found for: AST, ALT, GGT, ALKPHOS, BILITOT, ALBUMIN, PROT  CBC:   Lab Results   Component Value Date    WBC 7.68 02/15/2017    HGB 10.4 (L) 02/15/2017    HCT 32.2 (L) 02/15/2017    MCV 81 (L) 02/15/2017     02/15/2017     Microbiology x 7d:   Microbiology Results (last 7 days)     ** No results found for the last 168 hours. **        Imaging:  MRI of the brain with multiple contrast enhancing cerebellar lesions suggestive of brain mets..  I personally reviewed the above image.    ASSESSMENT/PLAN:     Active Hospital Problems    Diagnosis    Hydrocephalus        Active Problem List:   1. Obstructive hydrocephalus s/p VPS.  2. Metastatic breast cancer.      Assessment / Plan:     Neuro:  -Bedside swallowing assessment.  -Neurontin 300mg tid x 48hrs  -Norco PRN .  -Tylenol PRN  -Decadrone 4mg q6hrs   -EEG 30 min tomorrow. Start antiepileptics if irritability.  -Zoloft 15mg qAM  -Baclofen 20 mg q 8hrs.  Resp:  -RA  -IS  CV: SBP <=160 mmHg.  -Labetalol PRN  IVF/nutrition/Renal/GI:  -PO diet.  -NS at 75 cc/hr IVD  -BR.  Hem / ID:  -SC Heparin prophylaxis tomorrow if not ambulatory. Discuss with NSU.  Endo:  -SSI qAC  Prophylaxis:  -Protonix 40mg qd  -SCDs.  Advance Directives and Disposition:    Full Code. Transfer to NSU in AM.        Uninterrupted level 2 Follow-up /Counseling Time (not including procedures): = 35 min                     Bill Acosta MD, MPH, FAHA,  Neurocritical Care Department Faculty

## 2017-02-17 VITALS
HEIGHT: 58 IN | OXYGEN SATURATION: 95 % | HEART RATE: 59 BPM | WEIGHT: 185.63 LBS | RESPIRATION RATE: 18 BRPM | DIASTOLIC BLOOD PRESSURE: 63 MMHG | TEMPERATURE: 98 F | BODY MASS INDEX: 38.97 KG/M2 | SYSTOLIC BLOOD PRESSURE: 134 MMHG

## 2017-02-17 LAB
ANION GAP SERPL CALC-SCNC: 10 MMOL/L
BASOPHILS # BLD AUTO: 0 K/UL
BASOPHILS NFR BLD: 0 %
BUN SERPL-MCNC: 9 MG/DL
CALCIUM SERPL-MCNC: 9.3 MG/DL
CHLORIDE SERPL-SCNC: 110 MMOL/L
CO2 SERPL-SCNC: 22 MMOL/L
CREAT SERPL-MCNC: 0.8 MG/DL
DIFFERENTIAL METHOD: ABNORMAL
EOSINOPHIL # BLD AUTO: 0 K/UL
EOSINOPHIL NFR BLD: 0 %
ERYTHROCYTE [DISTWIDTH] IN BLOOD BY AUTOMATED COUNT: 14.9 %
EST. GFR  (AFRICAN AMERICAN): >60 ML/MIN/1.73 M^2
EST. GFR  (NON AFRICAN AMERICAN): >60 ML/MIN/1.73 M^2
GLUCOSE SERPL-MCNC: 125 MG/DL
HCT VFR BLD AUTO: 31 %
HGB BLD-MCNC: 10.1 G/DL
LYMPHOCYTES # BLD AUTO: 0.7 K/UL
LYMPHOCYTES NFR BLD: 10.3 %
MCH RBC QN AUTO: 26 PG
MCHC RBC AUTO-ENTMCNC: 32.6 %
MCV RBC AUTO: 80 FL
MONOCYTES # BLD AUTO: 0.2 K/UL
MONOCYTES NFR BLD: 3.4 %
NEUTROPHILS # BLD AUTO: 5.9 K/UL
NEUTROPHILS NFR BLD: 86.2 %
PLATELET # BLD AUTO: 344 K/UL
PMV BLD AUTO: 10.2 FL
POCT GLUCOSE: 108 MG/DL (ref 70–110)
POCT GLUCOSE: 152 MG/DL (ref 70–110)
POCT GLUCOSE: 255 MG/DL (ref 70–110)
POTASSIUM SERPL-SCNC: 4.1 MMOL/L
RBC # BLD AUTO: 3.88 M/UL
SODIUM SERPL-SCNC: 142 MMOL/L
WBC # BLD AUTO: 6.81 K/UL

## 2017-02-17 PROCEDURE — 63600175 PHARM REV CODE 636 W HCPCS: Performed by: PHYSICIAN ASSISTANT

## 2017-02-17 PROCEDURE — 85025 COMPLETE CBC W/AUTO DIFF WBC: CPT

## 2017-02-17 PROCEDURE — 25000003 PHARM REV CODE 250: Performed by: NEUROLOGICAL SURGERY

## 2017-02-17 PROCEDURE — 25000003 PHARM REV CODE 250: Performed by: NURSE PRACTITIONER

## 2017-02-17 PROCEDURE — 36415 COLL VENOUS BLD VENIPUNCTURE: CPT

## 2017-02-17 PROCEDURE — 25000003 PHARM REV CODE 250: Performed by: STUDENT IN AN ORGANIZED HEALTH CARE EDUCATION/TRAINING PROGRAM

## 2017-02-17 PROCEDURE — 25000003 PHARM REV CODE 250: Performed by: PHYSICIAN ASSISTANT

## 2017-02-17 PROCEDURE — 80048 BASIC METABOLIC PNL TOTAL CA: CPT

## 2017-02-17 RX ORDER — HEPARIN 100 UNIT/ML
3 SYRINGE INTRAVENOUS ONCE
Status: COMPLETED | OUTPATIENT
Start: 2017-02-17 | End: 2017-02-17

## 2017-02-17 RX ORDER — PANTOPRAZOLE SODIUM 40 MG/1
40 TABLET, DELAYED RELEASE ORAL DAILY
Qty: 30 TABLET | Refills: 0 | Status: SHIPPED | OUTPATIENT
Start: 2017-02-17 | End: 2017-03-16 | Stop reason: SDUPTHER

## 2017-02-17 RX ORDER — HYDROCODONE BITARTRATE AND ACETAMINOPHEN 5; 325 MG/1; MG/1
1 TABLET ORAL EVERY 4 HOURS PRN
Qty: 60 TABLET | Refills: 0 | Status: SHIPPED | OUTPATIENT
Start: 2017-02-17 | End: 2017-02-17

## 2017-02-17 RX ORDER — PANTOPRAZOLE SODIUM 40 MG/1
40 TABLET, DELAYED RELEASE ORAL DAILY
Qty: 90 TABLET | Refills: 0 | Status: SHIPPED | OUTPATIENT
Start: 2017-02-17 | End: 2017-02-17

## 2017-02-17 RX ORDER — ALPRAZOLAM 1 MG/1
1 TABLET ORAL 2 TIMES DAILY PRN
Qty: 4 TABLET | Refills: 0 | Status: SHIPPED | OUTPATIENT
Start: 2017-02-17 | End: 2017-03-19

## 2017-02-17 RX ORDER — DEXAMETHASONE 2 MG/1
2 TABLET ORAL SEE ADMIN INSTRUCTIONS
Qty: 90 TABLET | Refills: 0 | Status: SHIPPED | OUTPATIENT
Start: 2017-02-17 | End: 2017-02-17

## 2017-02-17 RX ORDER — LISINOPRIL 20 MG/1
20 TABLET ORAL DAILY
Qty: 90 TABLET | Refills: 1 | Status: SHIPPED | OUTPATIENT
Start: 2017-02-17

## 2017-02-17 RX ORDER — DEXAMETHASONE 2 MG/1
2 TABLET ORAL SEE ADMIN INSTRUCTIONS
Qty: 1 TABLET | Refills: 0 | Status: SHIPPED | OUTPATIENT
Start: 2017-02-17 | End: 2017-02-17

## 2017-02-17 RX ORDER — PANTOPRAZOLE SODIUM 40 MG/1
40 TABLET, DELAYED RELEASE ORAL DAILY
Qty: 30 TABLET | Refills: 0 | Status: SHIPPED | OUTPATIENT
Start: 2017-02-17 | End: 2017-02-17

## 2017-02-17 RX ORDER — ALPRAZOLAM 1 MG/1
1 TABLET ORAL 2 TIMES DAILY PRN
Qty: 4 TABLET | Refills: 0 | Status: SHIPPED | OUTPATIENT
Start: 2017-02-17 | End: 2017-02-17

## 2017-02-17 RX ORDER — LISINOPRIL 20 MG/1
20 TABLET ORAL DAILY
Qty: 90 TABLET | Refills: 1 | OUTPATIENT
Start: 2017-02-17 | End: 2017-02-17

## 2017-02-17 RX ORDER — DEXAMETHASONE 2 MG/1
TABLET ORAL
Qty: 90 TABLET | Refills: 0 | OUTPATIENT
Start: 2017-02-17 | End: 2017-02-17

## 2017-02-17 RX ORDER — DEXAMETHASONE 2 MG/1
TABLET ORAL
Qty: 90 TABLET | Refills: 0 | Status: SHIPPED | OUTPATIENT
Start: 2017-02-17

## 2017-02-17 RX ORDER — HYDROCODONE BITARTRATE AND ACETAMINOPHEN 5; 325 MG/1; MG/1
1 TABLET ORAL EVERY 4 HOURS PRN
Qty: 60 TABLET | Refills: 0 | Status: SHIPPED | OUTPATIENT
Start: 2017-02-17

## 2017-02-17 RX ORDER — LISINOPRIL 20 MG/1
20 TABLET ORAL DAILY
Qty: 90 TABLET | Refills: 1 | Status: SHIPPED | OUTPATIENT
Start: 2017-02-17 | End: 2017-02-17

## 2017-02-17 RX ORDER — PANTOPRAZOLE SODIUM 40 MG/1
40 TABLET, DELAYED RELEASE ORAL DAILY
Qty: 30 TABLET | Refills: 0 | OUTPATIENT
Start: 2017-02-17 | End: 2017-02-17

## 2017-02-17 RX ADMIN — BACLOFEN 20 MG: 10 TABLET ORAL at 05:02

## 2017-02-17 RX ADMIN — DEXAMETHASONE SODIUM PHOSPHATE 4 MG: 4 INJECTION, SOLUTION INTRAMUSCULAR; INTRAVENOUS at 06:02

## 2017-02-17 RX ADMIN — HEPARIN SODIUM 5000 UNITS: 5000 INJECTION, SOLUTION INTRAVENOUS; SUBCUTANEOUS at 02:02

## 2017-02-17 RX ADMIN — DEXAMETHASONE SODIUM PHOSPHATE 4 MG: 4 INJECTION, SOLUTION INTRAMUSCULAR; INTRAVENOUS at 12:02

## 2017-02-17 RX ADMIN — BACLOFEN 20 MG: 10 TABLET ORAL at 02:02

## 2017-02-17 RX ADMIN — SERTRALINE HYDROCHLORIDE 50 MG: 50 TABLET ORAL at 09:02

## 2017-02-17 RX ADMIN — STANDARDIZED SENNA CONCENTRATE AND DOCUSATE SODIUM 1 TABLET: 8.6; 5 TABLET, FILM COATED ORAL at 09:02

## 2017-02-17 RX ADMIN — LISINOPRIL 20 MG: 20 TABLET ORAL at 09:02

## 2017-02-17 RX ADMIN — HEPARIN SODIUM (PORCINE) LOCK FLUSH IV SOLN 100 UNIT/ML 300 UNITS: 100 SOLUTION at 06:02

## 2017-02-17 RX ADMIN — HEPARIN SODIUM 5000 UNITS: 5000 INJECTION, SOLUTION INTRAVENOUS; SUBCUTANEOUS at 05:02

## 2017-02-17 RX ADMIN — ONDANSETRON 8 MG: 4 TABLET, FILM COATED ORAL at 06:02

## 2017-02-17 RX ADMIN — DEXAMETHASONE SODIUM PHOSPHATE 4 MG: 4 INJECTION, SOLUTION INTRAMUSCULAR; INTRAVENOUS at 05:02

## 2017-02-17 RX ADMIN — ACETAMINOPHEN 650 MG: 325 TABLET ORAL at 09:02

## 2017-02-17 NOTE — ANESTHESIA POSTPROCEDURE EVALUATION
"Anesthesia Post Evaluation    Patient: Edgar Yang    Procedure(s) Performed: Procedure(s) (LRB):  ENDOSCOPIC  SHUNT W/ NAVIGATION (Right)    Final Anesthesia Type: general  Patient location during evaluation: PACU  Patient participation: Yes- Able to Participate  Level of consciousness: awake and alert and oriented  Post-procedure vital signs: reviewed and stable  Pain management: adequate  Airway patency: patent  PONV status at discharge: No PONV  Anesthetic complications: no      Cardiovascular status: hemodynamically stable  Respiratory status: unassisted, spontaneous ventilation and room air  Hydration status: euvolemic  Follow-up not needed.        Visit Vitals    /83    Pulse 66    Temp 36.4 °C (97.6 °F) (Oral)    Resp 20    Ht 4' 10" (1.473 m)    Wt 84.2 kg (185 lb 10 oz)    SpO2 97%    Breastfeeding No    BMI 38.8 kg/m2       Pain/Stevan Score: Pain Assessment Performed: Yes (2/16/2017  8:00 PM)  Presence of Pain: denies (2/16/2017  5:00 PM)  Pain Rating Prior to Med Admin: 9 (2/16/2017  1:11 AM)      "

## 2017-02-17 NOTE — PLAN OF CARE
CM request CT, MRI, and shunt series downloaded on CD for pt to take to RAD/ONC outside MD from [facility] in imaging library. CM will  and deliver to pt's room. CM update pt's bedside RN. Pt to d/c home today once transportation arrives.    1315 PM ADDENDUM: CM deliver pt's CD to pt's room and place in pt's duffle bag per pt's request. Pt lethargic when CM enter room.

## 2017-02-17 NOTE — PLAN OF CARE
Problem: Infection, Risk/Actual (Adult)  Goal: Infection Prevention/Resolution  Patient will demonstrate the desired outcomes by discharge/transition of care.   Outcome: Ongoing (interventions implemented as appropriate)  Plan of care reviewed with pt at bedside. Safety precautions initiated. Bed locked in lowest position, call bell within reach, Bed alarm on. [id number].

## 2017-02-17 NOTE — DISCHARGE INSTRUCTIONS
Please follow ONLY the instructions that are checked below.    Activity Restrictions:  [x]  Return to work will be determined on an individual basis.  [x]  No lifting greater than 10 pounds.  [x]  No driving or operating machinery:  [x]  until cleared by your surgeon.  [x]  while taking narcotic pain medications or muscle relaxants.  [x]  Walk on paved surfaces only. It is okay to walk up and down stairs while holding onto a side rail.  [x]  No sexual activity for 2-3 weeks.    Discharge Medication/Follow-up:  [x]  Please refer to discharge medication reconciliation form.  [x]  Do not take ANY non-steroidal anti-inflammatory drugs (NSAIDS), including the following: ibuprofen, naprosyn, Aleve, Advil, Indocin, Mobic, or Celebrex for:  [x]  4 weeks  []  8 weeks  []  6 months  [x]  Prescriptions for appropriate medication will be given upon discharge.    [x]  Take docusate (Colace 100 mg): take one capsule a day as needed for constipation. You can get this over the counter.  [x]  Follow-up appointment:  [x]  2 weeks with in Neurosurgery clinic with repeat HCT               [x]  This Tuesday with Heme/Onc and Rad/Onc at [facility]     Wound Care:  [x]  Cranial incision: Keep open to air, Bacitracin to incision twice a day.  Abdominal incisions keep steri strips dry and intact until follow up  [x]  You may shower on the 2nd day after your surgery. Have the force of water hit you opposite from the cranial incision. Pat the incision dry after your shower; do not scrub the incision.      Call your doctor or go to the Emergency Room for any signs of infection, including: increased redness, drainage, pain, or fever (temperature ?101.5 for 24 hours). Call your doctor or go to the Emergency Room if there are any localized neurological changes; problems with speech, vision, numbness, tingling, weakness, or severe headache; or for other concerns.    Special Instructions:  [x]  No use of tobacco products.  [x]  Diet:  Please eat a regular diet as tolerated.  []  Other diet:              Specific physician instructions:           Physicians need 3 days' notice for pain medicine to be refilled. Pain medicine will only be refilled between 8 AM and 5 PM, Monday through Friday, due to Food and Drug Administration regulation of documentation.    If you have any questions about this form, please call [phone].    Form No. 66784 (Revised 10/31/2013)

## 2017-02-18 NOTE — PROGRESS NOTES
Pt given home medication regiment and education . Pt verbalized understanding of DC medication regiment. Pt confirmed pharmacy. Pt instructed on future appointments. Pt informed of [facility] nurse hotline. Port deaccessed and Tele removed. PT DC'd per physician order.

## 2017-02-20 NOTE — PLAN OF CARE
02/20/17 0914   Final Note   Assessment Type Final Discharge Note   Discharge Disposition Home   Discharge planning education complete? Yes   Discharge plans and expectations educations in teach back method with documentation complete? Yes   Offered [facility] -- Bedside Delivery? n/a   Discharge/Hospital Encounter Summary to (non-[facility]) PCP n/a   Referral to Outpatient Case Management complete? n/a   Referral to / orders for Home Health Complete? n/a   30 day supply of medicines given at discharge, if documented non-compliance / non-adherence? n/a   Any social issues identified prior to discharge? Yes   Did you assess the readiness or willingness of the family or caregiver to support self management of care? Yes

## 2017-02-21 PROBLEM — Z15.09 BRCA1 GENE MUTATION POSITIVE: Status: ACTIVE | Noted: 2017-02-21

## 2017-02-21 PROBLEM — C79.31 SECONDARY ADENOCARCINOMA OF BRAIN: Status: ACTIVE | Noted: 2017-02-21

## 2017-02-21 PROBLEM — Z15.01 BRCA1 GENE MUTATION POSITIVE: Status: ACTIVE | Noted: 2017-02-21

## 2017-02-21 PROBLEM — C79.31 BRAIN METASTASES: Status: ACTIVE | Noted: 2017-02-21

## 2017-03-03 NOTE — DISCHARGE SUMMARY
[facility]  Discharge Summary  Neurosurgery    Admit Date: 2/15/2017    Discharge Date: 2/17/17    Attending Physician: Rajan Lawson M.D.      Discharge Physician: Edouard Rizzo PA-C    Reason for Admission: This is a 36-year-old woman with a history of metastatic breast   cancer, status post double mastectomy and chemotherapy, who presented to the   Emergency Room with one-day of headache and gait instability. Imaging revealed   multiple intracranial metastases, primarily in the infratentorial space. There   was a large lesion involving the right cerebellar tonsil with mass effect on the  fourth ventricle causing early hydrocephalus. Given the early hydrocephalus,   the recommendation was for placement of a right ventriculoperitoneal shunt.   Risks, benefits, alternatives, indications and methods were reviewed in detail       Procedures Performed: Procedure(s) (LRB):  ENDOSCOPIC  SHUNT W/ NAVIGATION (Right)    Hospital Course: Post operatively patient did well and went to the ICU overnight. Post op HCT and shunt series were stable.  She was transferred to the floor POD 1.  Hem/Onc and Rad/Onc were consulted.  RT was recommend, and scheduled to begin at [facility] the following Monday.  Pain was controlled on po medications. Pt was able to void on her own.  Pt was was ultimately discharged home on  POD 2.  At the time of DC her VSS, she was afebrile, and neurologically stable.  He was counseled on wound care, activity restrictions, and follow up prior to discharge.     Discharged Condition: stable   Principal Problem: Brain metastases      Disposition: Home or Self Care    Medications:  Reconciled Home Medications:   Discharge Medication List as of 2/17/2017  5:08 PM      START taking these medications    Details   !! hydrocodone-acetaminophen 5-325mg (NORCO) 5-325 mg per tablet Take 1 tablet by mouth every 4 (four) hours as needed., Starting 2/17/2017, Until Discontinued,  Print      lisinopril (PRINIVIL,ZESTRIL) 20 MG tablet Take 1 tablet (20 mg total) by mouth once daily., Starting 2/17/2017, Until Discontinued, Print       !! - Potential duplicate medications found. Please discuss with provider.      CONTINUE these medications which have CHANGED    Details   dexamethasone (DECADRON) 2 MG tablet Take 1 tablet (2 mg total) by mouth As instructed., Starting 2/17/2017, Until Discontinued, Print      pantoprazole (PROTONIX) 40 MG tablet Take 1 tablet (40 mg total) by mouth once daily., Starting 2/17/2017, Until Discontinued, Print         CONTINUE these medications which have NOT CHANGED    Details   alprazolam (XANAX) 1 MG tablet Take 1 mg by mouth, Historical Med      ondansetron (ZOFRAN) 8 MG tablet Take 4 mg by mouth, Historical Med      !! hydrocodone-acetaminophen 5-325mg (NORCO) 5-325 mg per tablet Take 1-2 tablets by mouth, Historical Med      acetaminophen (TYLENOL) 500 mg Cap Take by mouth 2 (two) times daily, Historical Med      baclofen (LIORESAL) 20 MG tablet Take 20 mg by mouth, Historical Med      promethazine (PHENERGAN) 25 MG tablet Take 25 mg by mouth, Historical Med      sertraline (ZOLOFT) 50 MG tablet Take 50 mg by mouth., Until Discontinued, Historical Med       !! - Potential duplicate medications found. Please discuss with provider.          Discharge Procedure Orders  Diet general     Call MD for:  temperature >100.4     Call MD for:  persistent nausea and vomiting or diarrhea     Call MD for:  severe uncontrolled pain     Call MD for:  redness, tenderness, or signs of infection (pain, swelling, redness, odor or green/yellow discharge around incision site)     Call MD for:  difficulty breathing or increased cough     Call MD for:  severe persistent headache     Call MD for:  worsening rash     Call MD for:  persistent dizziness, light-headedness, or visual disturbances     Call MD for:  increased confusion or weakness

## 2017-03-03 NOTE — PROGRESS NOTES
Progress Note  Neurosurgery    Admit Date: 2/15/2017  Post-operative Day: 15 Days Post-Op  Hospital Day: 3    SUBJECTIVE:     Edgar Yang is a 36 y.o. female with history of metastatic breast cancer and now s/p  shunt for hydrocephalus POD 2.  NAEON.  Pt reports improving of HAs.  Denies any complaints today.  Denies N/V, visual changes, weakness, or seizures.  Denies F/C/CP/SOB.    Scheduled Meds:  Continuous Infusions:  PRN Meds:    Review of patient's allergies indicates:   Allergen Reactions    Azithromycin      SHAKES  SHAKES       OBJECTIVE:     Vital Signs (Most Recent)  Temp: 98.2 °F (36.8 °C) (02/17/17 1520)  Pulse: (!) 59 (02/17/17 1700)  Resp: 18 (02/17/17 1520)  BP: 134/63 (02/17/17 1520)  SpO2: 95 % (02/17/17 1520)    Vital Signs Range (Last 24H):       I & O (Last 24H):No intake or output data in the 24 hours ending 03/02/17 1830  Physical Exam:  General: no distress  Neurologic: Alert and oriented. Thought content appropriate.  Head: normocephalic  GCS: Motor: 6/Verbal: 5/Eyes: 4 GCS Total: 15  Cranial nerves: face symmetric, tongue midline, pupils equal, round, reactive to light with accomodation, extraocular muscles intact  Sensory: response to light touch throughout  Motor Strength:full strength upper and lower extremities  Pronator Drift: no drift noted  Finger to nose normal  No focal numbness or weakness  Lungs:  normal respiratory effort  Abdomen: soft, non-tender   Extremities: no cyanosis or edema, or clubbing      Wound/Incision:  clean, dry, intact, no drainage      Diagnostic Results:  CT head and shunt series: reviewed    ASSESSMENT/PLAN:     36 F with history of metastatic breast cancer now s/p  shunt for hydrocephalus POD 2  -Pt neurologically stable  -Postop HCT and shunt series are stable  -Appreciate Heme/Onc and Rad/Onc recommendations   -Pt is established with H/O and R/O at [facility].  Schedule to begin RT this Monday back home.  -Will DC today  -F/u in 14  days for wound check  -Discussed with Dr. Renny Rizzo Jr. PA-C  Neurosurgery  [phone]

## 2017-03-16 ENCOUNTER — OFFICE VISIT (OUTPATIENT)
Dept: NEUROSURGERY | Facility: CLINIC | Age: 37
End: 2017-03-16
Payer: MEDICAID

## 2017-03-16 VITALS
HEART RATE: 133 BPM | HEIGHT: 58 IN | TEMPERATURE: 99 F | SYSTOLIC BLOOD PRESSURE: 147 MMHG | DIASTOLIC BLOOD PRESSURE: 81 MMHG | BODY MASS INDEX: 40.54 KG/M2 | WEIGHT: 193.13 LBS

## 2017-03-16 DIAGNOSIS — C79.31 BRAIN METASTASES: Primary | ICD-10-CM

## 2017-03-16 DIAGNOSIS — M54.16 LUMBAR RADICULOPATHY: ICD-10-CM

## 2017-03-16 DIAGNOSIS — G91.9 HYDROCEPHALUS: ICD-10-CM

## 2017-03-16 PROCEDURE — 99213 OFFICE O/P EST LOW 20 MIN: CPT | Mod: PBBFAC | Performed by: NEUROLOGICAL SURGERY

## 2017-03-16 PROCEDURE — 99024 POSTOP FOLLOW-UP VISIT: CPT | Mod: ,,, | Performed by: NEUROLOGICAL SURGERY

## 2017-03-16 PROCEDURE — 99999 PR PBB SHADOW E&M-EST. PATIENT-LVL III: CPT | Mod: PBBFAC,,, | Performed by: NEUROLOGICAL SURGERY

## 2017-03-16 RX ORDER — PANTOPRAZOLE SODIUM 40 MG/1
TABLET, DELAYED RELEASE ORAL
Qty: 30 TABLET | Refills: 0 | Status: SHIPPED | OUTPATIENT
Start: 2017-03-16

## 2017-03-16 NOTE — PROGRESS NOTES
CHIEF COMPLAINT:  Post op    I, Rachel Rivas, am scribing for, and in the presence of, Dr. Lawson.    HPI:  Edgar Yang is a 36 y.o.  female, who presents today for a 1 month post op evaluation. Pt is s/p  shunt placement on 2/15/2017. Pt states that she is doing well. Pt denies headaches. Pt notes some sleepiness; however, she attributes this to steroid medication. Pt reports pain in the anterior BLE from the knees to the feet. This pain began yesterday. Pt describes the pain as dull/aching. Pt denies back pain. Pt presents today with a walker.    Review of patient's allergies indicates:   Allergen Reactions    Azithromycin      SHAKES  SHAKES       Past Medical History:   Diagnosis Date    Anxiety     Breast CA     Chemotherapy induced neutropenia     Depression     Esophageal stricture     Gallstone     GERD (gastroesophageal reflux disease)     IBS (irritable bowel syndrome)     Left breast mass     Metastasis from breast cancer     Bone    Obesity, unspecified     Unspecified transient mental disorder due to conditions classified elsewhere      Past Surgical History:   Procedure Laterality Date     SECTION      INTERTROCHANTERIC HIP FRACTURE SURGERY      MASTECTOMY Bilateral     NASAL ENDOSCOPY      PORTACATH PLACEMENT       No family history on file.  Social History   Substance Use Topics    Smoking status: Never Smoker    Smokeless tobacco: Not on file    Alcohol use No        Review of Systems   Constitutional: Negative.    HENT: Negative.    Eyes: Negative.    Respiratory: Negative.    Cardiovascular: Negative.    Gastrointestinal: Negative.    Endocrine: Negative.    Genitourinary: Negative.    Musculoskeletal: Positive for myalgias (BLE). Negative for back pain, gait problem and neck pain.   Skin: Negative.    Allergic/Immunologic: Negative.    Neurological: Negative for weakness, light-headedness, numbness and headaches.   Hematological: Negative.     Psychiatric/Behavioral: Negative.        OBJECTIVE:   Vital Signs:  Temp: 98.6 °F (37 °C) (03/16/17 1027)  Pulse: (!) 133 (03/16/17 1027)  BP: (!) 147/81 (03/16/17 1027)    Physical Exam:    Vital signs: All nursing notes and vital signs reviewed -- afebrile, vital signs stable.  Constitutional: Patient sitting comfortably in chair. Appears well developed and well nourished.  Skin: Exposed areas are intact without abnormal markings, rashes or other lesions.  HEENT: Normocephalic. Normal conjunctivae.  Cardiovascular: Normal rate and regular rhythm.  Respiratory: Chest wall rises and falls symmetrically, without signs of respiratory distress.  Abdomen: Soft and non-tender.  Extremities: Warm and without edema. Calves supple, non-tender.  Psych/Behavior: Normal affect.    Neurological:    Mental status: Alert and oriented. Conversational and appropriate.       Cranial Nerves: VFF to confrontation. PERRL. EOMI without nystagmus. Facial STLT normal and symmetric. Strong, symmetric muscles of mastication. Facial strength full and symmetric. Hearing equal bilaterally to finger rub. Palate and uvula rise and fall normally in midline. Shoulder shrug 5/5 strength. Tongue midline.    Motor: MAEW    Diagnostic Results:  All imaging was independently reviewed by me.    CT Head, dated 2/16/2017:  1. Good placement of  shunt    ASSESSMENT/PLAN:     Edgar Yang has been doing well 4 weeks after placement of right  shunt for communicating hydrocephalus, secondary to diffuse metastatic breast cancer. She is neurologically at baseline and is doing well. She reports no headaches or lethargy. On exam, she is neurologically intact. Her incision is well healed, and her staples were removed today in clinic. She does report new radicular leg pain bilaterally without back pain which began 24 hours ago. I think it would be worthwhile to get an MRI of the C, T and L-spine to evaluate for metastatic spinal lesions. We will also order  a shunt xray series to evaluate the shunt setting after MRI. The pressure setting of the shunt should remain at 4. She will follow up in 3 months for routine evaluation or sooner depending on MRI results. Her MRI brain will be coordinated by her oncologist.    The patient understands and agrees with the plan of care. All questions were answered.     1. MRI C, T and L-spine  2. Shunt Series X-ray  3. RTC 3 months    I, Dr. Lawson, personally performed the services described in this documentation as scribed by Rachel Rivas in my presence, and it is both accurate and complete.      Rajan Lawson M.D.  Department of Neurosurgery  [facility]

## 2017-03-16 NOTE — LETTER
March 16, 2017      Nataliia Poole MD  [address] [place] [address]           [facility] - Neurosurgery 7th Fl  [address]  [facility] [address]  Phone: [phone]          Patient: Edgar Yang   MR Number: [id number]   YOB: 1980   Date of Visit: 3/16/2017       Dear Dr. Nataliia Poole:    Thank you for referring Edgar Yang to me for evaluation. Attached you will find relevant portions of my assessment and plan of care.    If you have questions, please do not hesitate to call me. I look forward to following Edgar Yang along with you.    Sincerely,    Rajan Lawson MD    Enclosure  CC:  No Recipients    If you would like to receive this communication electronically, please contact [email or web address] or [phone] to request more information on [employer] access.    For providers and/or their staff who would like to refer a patient to [facility], please contact us through our one-stop-shop provider referral line, [facility], at [phone].    If you feel you have received this communication in error or would no longer like to receive these types of communications, please e-mail [email or web address]

## 2017-03-24 ENCOUNTER — HOSPITAL ENCOUNTER (OUTPATIENT)
Dept: RADIOLOGY | Facility: HOSPITAL | Age: 37
Discharge: HOME | End: 2017-03-24
Attending: NEUROLOGICAL SURGERY
Payer: MEDICAID

## 2017-03-24 DIAGNOSIS — C79.31 BRAIN METASTASES: ICD-10-CM

## 2017-03-24 DIAGNOSIS — M54.16 LUMBAR RADICULOPATHY: ICD-10-CM

## 2017-03-24 DIAGNOSIS — G91.9 HYDROCEPHALUS: ICD-10-CM

## 2017-03-24 PROCEDURE — 25500020 PHARM REV CODE 255: Performed by: NEUROLOGICAL SURGERY

## 2017-03-24 PROCEDURE — 72156 MRI NECK SPINE W/O & W/DYE: CPT | Mod: TC

## 2017-03-24 PROCEDURE — 76000 FLUOROSCOPY <1 HR PHYS/QHP: CPT | Mod: TC

## 2017-03-24 PROCEDURE — 72156 MRI NECK SPINE W/O & W/DYE: CPT | Mod: 26,,, | Performed by: RADIOLOGY

## 2017-03-24 PROCEDURE — 72157 MRI CHEST SPINE W/O & W/DYE: CPT | Mod: 26,,, | Performed by: RADIOLOGY

## 2017-03-24 PROCEDURE — 72158 MRI LUMBAR SPINE W/O & W/DYE: CPT | Mod: TC

## 2017-03-24 PROCEDURE — 72158 MRI LUMBAR SPINE W/O & W/DYE: CPT | Mod: 26,,, | Performed by: RADIOLOGY

## 2017-03-24 PROCEDURE — A9585 GADOBUTROL INJECTION: HCPCS | Performed by: NEUROLOGICAL SURGERY

## 2017-03-24 PROCEDURE — 72157 MRI CHEST SPINE W/O & W/DYE: CPT | Mod: TC

## 2017-03-24 PROCEDURE — 76000 FLUOROSCOPY <1 HR PHYS/QHP: CPT | Mod: 26,,, | Performed by: RADIOLOGY

## 2017-03-24 PROCEDURE — 25000003 PHARM REV CODE 250: Performed by: NEUROLOGICAL SURGERY

## 2017-03-24 RX ORDER — GADOBUTROL 604.72 MG/ML
9 INJECTION INTRAVENOUS
Status: COMPLETED | OUTPATIENT
Start: 2017-03-24 | End: 2017-03-24

## 2017-03-24 RX ORDER — HEPARIN 100 UNIT/ML
5 SYRINGE INTRAVENOUS ONCE
Status: COMPLETED | OUTPATIENT
Start: 2017-03-24 | End: 2017-03-24

## 2017-03-24 RX ADMIN — GADOBUTROL 9 ML: 604.72 INJECTION INTRAVENOUS at 01:03

## 2017-03-24 RX ADMIN — HEPARIN SODIUM (PORCINE) LOCK FLUSH IV SOLN 100 UNIT/ML 500 UNITS: 100 SOLUTION at 02:03

## 2017-03-24 NOTE — PROGRESS NOTES
@1300-Port accessed using sterile technique with a 1in echols needle. Patient tolerated well. No blood return noted. Flushed well.    @1415- Port de-accessed post heparin administration. Band-aid applied to sit    Patient ambulated to lobby.

## 2017-05-31 ENCOUNTER — TELEPHONE (OUTPATIENT)
Dept: NEUROSURGERY | Facility: CLINIC | Age: 37
End: 2017-05-31

## 2017-05-31 NOTE — TELEPHONE ENCOUNTER
Spoke with pt's . Per pt's , pt was in an outside hospital. He reported that pt was experiencing pain in her bilateral lower extremities, rated as a 7/10. He stated that pt denied any numbness or weakness in her bilateral lower extremities. He additionally reported that pt has experienced intermittent episodes of tingling in her bilateral feet, which began about 2 weeks ago. Pt's  stated that pt denied any bowel/bladder issues. He reported that pt had an MRI brain at the outside hospital, but stated that MRI of the spine was not indicated and therefore not performed. Pt's  inquired about having an MRI of the spine ordered for pt, but he reported that there would be issues obtaining this due to pt's insurance. Per Guerrero Kelley PA-C, advised pt's  that pt should come to the emergency room if they feel that it is necessary. Pt is scheduled for a 3 month follow up appointment on 6/20/2017. Advised pt's  to call back if any new symptoms develop. Reiterated that pt should come to the emergency room if she feels that she needs to. Pt's  verbalized understanding.

## 2017-06-20 ENCOUNTER — OFFICE VISIT (OUTPATIENT)
Dept: NEUROSURGERY | Facility: CLINIC | Age: 37
End: 2017-06-20
Payer: COMMERCIAL

## 2017-06-20 VITALS — BODY MASS INDEX: 37.32 KG/M2 | HEIGHT: 58 IN | WEIGHT: 177.81 LBS | TEMPERATURE: 98 F

## 2017-06-20 DIAGNOSIS — C79.31 BRAIN METASTASES: Primary | ICD-10-CM

## 2017-06-20 PROCEDURE — 99999 PR PBB SHADOW E&M-EST. PATIENT-LVL II: CPT | Mod: PBBFAC,,, | Performed by: NEUROLOGICAL SURGERY

## 2017-06-20 PROCEDURE — 99213 OFFICE O/P EST LOW 20 MIN: CPT | Mod: S$PBB,,, | Performed by: NEUROLOGICAL SURGERY

## 2017-06-20 PROCEDURE — 99212 OFFICE O/P EST SF 10 MIN: CPT | Mod: PBBFAC | Performed by: NEUROLOGICAL SURGERY

## 2017-06-20 RX ORDER — GABAPENTIN 100 MG/1
100 CAPSULE ORAL
COMMUNITY
Start: 2017-06-12 | End: 2018-06-12

## 2017-06-20 NOTE — PROGRESS NOTES
CHIEF COMPLAINT:  Follow up     I, Heron Hahn, attest that this documentation has been prepared under the direction and in the presence of Rajan Ford MD.    HPI:  Edgar Yang is a 36 y.o.  female, who presents today for a follow up evaluation. Pt is s/p  shunt placement on 2/15/2017. Pt reports she is doing well. She denies any headaches. Pt presents today with a walker. Pt continues to experience anterior BLE pain from the knees to the feet. Pt is taking currently taking gabapentin. She is not receiving chemotherapy at this time.        Review of patient's allergies indicates:   Allergen Reactions    Azithromycin      SHAKES  SHAKES    Oxybenzone(benzophenone 3)     Oxycodone (bulk)        Past Medical History:   Diagnosis Date    Anxiety     Breast CA     Chemotherapy induced neutropenia     Depression     Esophageal stricture     Gallstone     GERD (gastroesophageal reflux disease)     IBS (irritable bowel syndrome)     Left breast mass     Metastasis from breast cancer     Bone    Obesity, unspecified     Unspecified transient mental disorder due to conditions classified elsewhere      Past Surgical History:   Procedure Laterality Date     SECTION      INTERTROCHANTERIC HIP FRACTURE SURGERY      MASTECTOMY Bilateral     NASAL ENDOSCOPY      PORTACATH PLACEMENT       No family history on file.  Social History   Substance Use Topics    Smoking status: Never Smoker    Smokeless tobacco: Not on file    Alcohol use No        Review of Systems   Constitutional: Negative.    HENT: Negative.    Eyes: Negative.    Respiratory: Negative.    Cardiovascular: Negative.    Gastrointestinal: Negative.    Endocrine: Negative.    Genitourinary: Negative.    Musculoskeletal: Positive for gait problem (walker) and myalgias (BLE pain). Negative for back pain and neck pain.   Skin: Negative.    Allergic/Immunologic: Negative.    Neurological: Negative for weakness, light-headedness,  numbness and headaches.   Hematological: Negative.    Psychiatric/Behavioral: Negative.        OBJECTIVE:   Vital Signs:  Temp: 97.6 °F (36.4 °C) (06/20/17 0926)    Physical Exam:    Vital signs: All nursing notes and vital signs reviewed -- afebrile, vital signs stable.  Constitutional: Patient sitting comfortably in chair. Appears well developed and well nourished.  Skin: Exposed areas are intact without abnormal markings, rashes or other lesions.  HEENT: Normocephalic. Normal conjunctivae.  Cardiovascular: Normal rate and regular rhythm.  Respiratory: Chest wall rises and falls symmetrically, without signs of respiratory distress.  Abdomen: Soft and non-tender.  Extremities: Warm and without edema. Calves supple, non-tender.  Psych/Behavior: Normal affect.    Neurological:    Mental status: Alert and oriented. Conversational and appropriate.       Cranial Nerves: VFF to confrontation. PERRL. EOMI without nystagmus. Facial STLT normal and symmetric. Strong, symmetric muscles of mastication. Facial strength full and symmetric. Hearing equal bilaterally to finger rub. Palate and uvula rise and fall normally in midline. Shoulder shrug 5/5 strength. Tongue midline.    Motor: MAEW    Diagnostic Results:  All imaging was independently reviewed by me.    No new imaging for my review.     ASSESSMENT/PLAN:     Edgar aYng is doing well 5 months after right  shunt placement for obstructive hydrocephalus secondary to metastatic breast cancer. Clinically, she is doing well, and there are no signs of hydrocephalus.  I do not have new films for my review; however, per pt, her latest round of MRI show significant regression of the intracranial lesions and a stable lesion in the lumbar spine. She will be acquiring a PET scan soon as well as MRIs of the entire neuraxis in 5 months before our next evaluation. I have asked her to bring the new images at that time.    The patient understands and agrees with the plan of care. All  questions were answered.     1. RTC 5 months with MRI C, T, & L-spines     I, Dr. Rajan Lawson personally performed the services described in this documentation. All medical record entries made by the scribe, Heron Hahn, were at my direction and in my presence.  I have reviewed the chart and agree that the record reflects my personal performance and is accurate and complete.      Rajan Lawson M.D.  Department of Neurosurgery  [facility]

## 2017-09-13 ENCOUNTER — TELEPHONE (OUTPATIENT)
Dept: NEUROSURGERY | Facility: CLINIC | Age: 37
End: 2017-09-13

## 2017-09-13 NOTE — TELEPHONE ENCOUNTER
Spoke with pt spouse, gave him the apt date and time the pt spouse is aware and it fine with apt and I let the pt know that the apt letter will be mailed out .

## 2017-12-14 ENCOUNTER — TELEPHONE (OUTPATIENT)
Dept: NEUROSURGERY | Facility: CLINIC | Age: 37
End: 2017-12-14

## 2017-12-14 ENCOUNTER — OFFICE VISIT (OUTPATIENT)
Dept: NEUROSURGERY | Facility: CLINIC | Age: 37
End: 2017-12-14
Payer: COMMERCIAL

## 2017-12-14 VITALS
HEART RATE: 115 BPM | DIASTOLIC BLOOD PRESSURE: 74 MMHG | HEIGHT: 58 IN | WEIGHT: 162.63 LBS | BODY MASS INDEX: 34.14 KG/M2 | SYSTOLIC BLOOD PRESSURE: 106 MMHG | TEMPERATURE: 98 F

## 2017-12-14 DIAGNOSIS — C79.31 BRAIN METASTASES: Primary | ICD-10-CM

## 2017-12-14 PROCEDURE — 99214 OFFICE O/P EST MOD 30 MIN: CPT | Mod: S$PBB,,, | Performed by: NEUROLOGICAL SURGERY

## 2017-12-14 PROCEDURE — 99214 OFFICE O/P EST MOD 30 MIN: CPT | Mod: PBBFAC | Performed by: NEUROLOGICAL SURGERY

## 2017-12-14 PROCEDURE — 99999 PR PBB SHADOW E&M-EST. PATIENT-LVL IV: CPT | Mod: PBBFAC,,, | Performed by: NEUROLOGICAL SURGERY

## 2017-12-14 RX ORDER — TRAMADOL HYDROCHLORIDE 50 MG/1
50 TABLET ORAL
COMMUNITY
Start: 2017-11-22 | End: 2018-11-22

## 2017-12-14 RX ORDER — FAMOTIDINE 20 MG/1
20 TABLET, FILM COATED ORAL
COMMUNITY

## 2017-12-14 NOTE — TELEPHONE ENCOUNTER
called patient with below message , she did not answer I left the message on voicemail       ----- Message from Tere Epperson RN sent at 12/13/2017  4:55 PM CST -----  Patient needs to be called and reminded to bring her imaging (PET scan, MRI, etc...)

## 2017-12-14 NOTE — PROGRESS NOTES
CHIEF COMPLAINT:  Follow Up     I, Heron Hahn, attest that this documentation has been prepared under the direction and in the presence of Rajan Lawson MD.    HPI:  Edgar Yang is a 37 y.o.  female who presents today for a follow up evaluation with new imaging. Pt is s/p  shunt placement on 2/15/2017. Pt reports she is doing very well. She notes occasional episodes of imbalance, which quickly resolve. Pt remains on chemotherapy and is beginning another round on the . She has not future plans for radiation therapy unless warranted. Pt is no longer on steroids.       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 No pertinent family history.  Social History   Substance Use Topics    Smoking status: Never Smoker    Smokeless tobacco: Never Used    Alcohol use No        Review of Systems   Constitutional: Negative.    HENT: Negative.    Eyes: Negative.    Respiratory: Negative.    Cardiovascular: Negative.    Gastrointestinal: Negative.    Endocrine: Negative.    Genitourinary: Negative.    Musculoskeletal: Negative for back pain, gait problem and neck pain.   Skin: Negative.    Allergic/Immunologic: Negative.    Neurological: Negative for weakness,  light-headedness, numbness and headaches.        Intermittent imbalance   Hematological: Negative.    Psychiatric/Behavioral: Negative.        OBJECTIVE:   Vital Signs:  Temp: 98.3 °F (36.8 °C) (12/14/17 1227)  Pulse: (!) 115 (12/14/17 1227)  BP: 106/74 (12/14/17 1227)    Physical Exam:    Vital signs: All nursing notes and vital signs reviewed -- afebrile, vital signs stable.  Constitutional: Patient sitting comfortably in chair. Appears well developed and well nourished.  Skin: Exposed areas are intact without abnormal markings, rashes or other lesions.  HEENT: Normocephalic. Normal conjunctivae.  Cardiovascular: Normal rate and regular rhythm.  Respiratory: Chest wall rises and falls symmetrically, without signs of respiratory distress.  Abdomen: Soft and non-tender.  Extremities: Warm and without edema. Calves supple, non-tender.  Psych/Behavior: Normal affect.    Neurological:    Mental status: Alert and oriented. Conversational and appropriate.       Cranial Nerves: VFF to confrontation. PERRL. EOMI without nystagmus. Facial STLT normal and symmetric. Strong, symmetric muscles of mastication. Facial strength full and symmetric. Hearing equal bilaterally to finger rub. Palate and uvula rise and fall normally in midline. Shoulder shrug 5/5 strength. Tongue midline.     Motor: MAEW    Diagnostic Results:  All imaging was independently reviewed by me.    MRI Brain, dated 11/20/2017:  1. No residual brain metastases    PET scan, dated 10/4/2017:  1. Good placement of proximal and distal catheter    ASSESSMENT/PLAN:     Edgar Yang is doing very well after radiation and chemptherapy for metastatic breast cancer to the brain and spine. Her recent MRI shows no residual disease. The PET scan showed good placement of the proximal and distal  shunt system.  I would like to get new baseline imaging with MRI C-spine, T-spine and L-spine. She will follow up for reevaluation in 6 months.     The patient  understands and agrees with the plan of care. All questions were answered.     1. MRI C-spine  2. MRI T-spine  3. MRI L-spine  4. RTC 6 months     I, Dr. Rajan Lawson personally performed the services described in this documentation. All medical record entries made by the scribe, Heron Hahn, were at my direction and in my presence.  I have reviewed the chart and agree that the record reflects my personal performance and is accurate and complete.        Rajan Lawson M.D.  Department of Neurosurgery  [facility]      .

## 2017-12-14 NOTE — LETTER
December 14, 2017      Arik Kaur MD  [address] [place] [address]           [facility] - Neurosurgery 7th Fl  [address]  [place] [address]  Phone: [phone]          Patient: Edgar Yang   MR Number: [id number]   YOB: 1980   Date of Visit: 12/14/2017       Dear Dr. Arik Kaur:    Thank you for referring Edgar Yang to me for evaluation. Attached you will find relevant portions of my assessment and plan of care.    If you have questions, please do not hesitate to call me. I look forward to following Edgar Yang along with you.    Sincerely,    Rajan Lawson MD    Enclosure  CC:  No Recipients    If you would like to receive this communication electronically, please contact [email or web address] or [phone] to request more information on [employer] Link access.    For providers and/or their staff who would like to refer a patient to [facility], please contact us through our one-stop-shop provider referral line, Pam Grijalva, at [phone].    If you feel you have received this communication in error or would no longer like to receive these types of communications, please e-mail [email or web address]

## 2017-12-18 ENCOUNTER — TELEPHONE (OUTPATIENT)
Dept: NEUROSURGERY | Facility: CLINIC | Age: 37
End: 2017-12-18

## 2017-12-26 ENCOUNTER — TELEPHONE (OUTPATIENT)
Dept: NEUROSURGERY | Facility: CLINIC | Age: 37
End: 2017-12-26

## 2017-12-26 NOTE — TELEPHONE ENCOUNTER
Spoke with patient  .. He states that the patient insurance states that she needs a prior authorization . For MRI to be done. I  Informed  that a outside imaging referral was put in [facility] so that pre service can do the the patient prior authorization       .----- Message from Leidy Bauman sent at 12/26/2017  2:23 PM CST -----  Contact: PTs   PT is calling regarding the need to speak with Dr. Lawson regarding approval with insurance    Callback: [phone]

## (undated) DEVICE — ELECTRODE REM PLYHSV RETURN 9

## (undated) DEVICE — CARTRIDGE OIL

## (undated) DEVICE — MARKERS SPHERZ PASSIVE

## (undated) DEVICE — DRESSING LEUKOPLAST FLEX 1X3IN

## (undated) DEVICE — TUBING HF INSUFFLATION W/ FLTR

## (undated) DEVICE — SEE MEDLINE ITEM 146372

## (undated) DEVICE — BLADE SURG CARBON STEEL SZ11

## (undated) DEVICE — DRAPE STERI-DRAPE 1000 17X11IN

## (undated) DEVICE — SEE MEDLINE ITEM 157166

## (undated) DEVICE — ADHESIVE MASTISOL VIAL 48/BX

## (undated) DEVICE — TRACER ZIEM POINTER

## (undated) DEVICE — BUR BONE CUT MICRO TPS 3X3.8MM

## (undated) DEVICE — GAUZE SPONGE 4X4 12PLY

## (undated) DEVICE — SUT GUT PL. 4-0 27 FS-2

## (undated) DEVICE — SYR SLIP TIP 1CC

## (undated) DEVICE — SEE MEDLINE ITEM 156905

## (undated) DEVICE — TAPE SURG MEDIPORE 6X72IN

## (undated) DEVICE — NDL N SERIES MICRO-DISSECTION

## (undated) DEVICE — DRESSING TELFA N ADH 3X8

## (undated) DEVICE — CLOSURE SKIN STERI STRIP 1/4X3

## (undated) DEVICE — DURAPREP SURG SCRUB 26ML

## (undated) DEVICE — TUBE FRAZIER 5MM 2FT SOFT TIP

## (undated) DEVICE — DRAPE STERI INSTRUMENT 1018

## (undated) DEVICE — SPRAY MASTISOL

## (undated) DEVICE — SOL LR INJ 1000ML BG

## (undated) DEVICE — KIT SURGIFLO HEMOSTATIC MATRIX

## (undated) DEVICE — SUT MCRYL PLUS 4-0 PS2 27IN

## (undated) DEVICE — SUT 2-0 12-18IN SILK

## (undated) DEVICE — SET DECANTER MEDICHOICE

## (undated) DEVICE — KIT POWDER ABSORBABLE GELATIN

## (undated) DEVICE — SYR DISP LL 5CC

## (undated) DEVICE — TRAY CATH UM FOLEY SIL W 16FR

## (undated) DEVICE — CLOSURE SKIN STERI STRIP 1/2X4

## (undated) DEVICE — DRUG BACITRACIN ZINC

## (undated) DEVICE — DRAPE INCISE IOBAN 2 23X17IN

## (undated) DEVICE — SUT VICRYL PLUS 3-0 SH 18IN

## (undated) DEVICE — Device

## (undated) DEVICE — WARMER DRAPE STERILE LF

## (undated) DEVICE — KIT AXIEM CRAN N INVAS SHUNT

## (undated) DEVICE — CORD BIPOLAR 12 FOOT

## (undated) DEVICE — KIT ANTIFOG

## (undated) DEVICE — DIFFUSER

## (undated) DEVICE — STAPLER SKIN PROXIMATE WIDE

## (undated) DEVICE — MARKER SKIN STND TIP BLUE BARR

## (undated) DEVICE — BIT DRILL PERFORATOR DISP 14MM

## (undated) DEVICE — CONTAINER SPECIMEN STRL 4OZ

## (undated) DEVICE — TROCAR ENDOPATH XCEL 5MM 7.5CM